# Patient Record
Sex: FEMALE | Race: WHITE | NOT HISPANIC OR LATINO | Employment: UNEMPLOYED | URBAN - METROPOLITAN AREA
[De-identification: names, ages, dates, MRNs, and addresses within clinical notes are randomized per-mention and may not be internally consistent; named-entity substitution may affect disease eponyms.]

---

## 2022-07-22 ENCOUNTER — LAB REQUISITION (OUTPATIENT)
Dept: LAB | Facility: CLINIC | Age: 22
End: 2022-07-22

## 2022-07-22 DIAGNOSIS — O99.019 ANEMIA COMPLICATING PREGNANCY, UNSPECIFIED TRIMESTER: ICD-10-CM

## 2022-07-22 DIAGNOSIS — Z36.87 ENCOUNTER FOR ANTENATAL SCREENING FOR UNCERTAIN DATES: ICD-10-CM

## 2022-07-22 LAB
ERYTHROCYTE [DISTWIDTH] IN BLOOD BY AUTOMATED COUNT: 13.9 % (ref 10–15)
HCT VFR BLD AUTO: 32.7 % (ref 35–47)
HGB BLD-MCNC: 10.6 G/DL (ref 11.7–15.7)
MCH RBC QN AUTO: 29.1 PG (ref 26.5–33)
MCHC RBC AUTO-ENTMCNC: 32.4 G/DL (ref 31.5–36.5)
MCV RBC AUTO: 90 FL (ref 78–100)
PLATELET # BLD AUTO: 320 10E3/UL (ref 150–450)
RBC # BLD AUTO: 3.64 10E6/UL (ref 3.8–5.2)
WBC # BLD AUTO: 12.6 10E3/UL (ref 4–11)

## 2022-07-22 PROCEDURE — 85027 COMPLETE CBC AUTOMATED: CPT | Performed by: ADVANCED PRACTICE MIDWIFE

## 2022-07-22 PROCEDURE — 82728 ASSAY OF FERRITIN: CPT | Performed by: ADVANCED PRACTICE MIDWIFE

## 2022-07-22 PROCEDURE — 87653 STREP B DNA AMP PROBE: CPT | Performed by: ADVANCED PRACTICE MIDWIFE

## 2022-07-22 PROCEDURE — 36415 COLL VENOUS BLD VENIPUNCTURE: CPT | Performed by: ADVANCED PRACTICE MIDWIFE

## 2022-07-23 LAB — FERRITIN SERPL-MCNC: 8 NG/ML (ref 6–175)

## 2022-07-24 LAB — GP B STREP DNA SPEC QL NAA+PROBE: NEGATIVE

## 2022-08-09 ENCOUNTER — LAB REQUISITION (OUTPATIENT)
Dept: LAB | Facility: CLINIC | Age: 22
End: 2022-08-09
Payer: COMMERCIAL

## 2022-08-09 DIAGNOSIS — Z01.89 ENCOUNTER FOR OTHER SPECIFIED SPECIAL EXAMINATIONS: ICD-10-CM

## 2022-08-09 LAB — SARS-COV-2 RNA RESP QL NAA+PROBE: NEGATIVE

## 2022-08-09 PROCEDURE — U0005 INFEC AGEN DETEC AMPLI PROBE: HCPCS | Mod: ORL | Performed by: OBSTETRICS & GYNECOLOGY

## 2023-04-11 ENCOUNTER — TRANSFERRED RECORDS (OUTPATIENT)
Dept: HEALTH INFORMATION MANAGEMENT | Facility: CLINIC | Age: 23
End: 2023-04-11
Payer: MEDICAID

## 2023-05-02 ENCOUNTER — TELEPHONE (OUTPATIENT)
Dept: ORTHOPEDICS | Facility: CLINIC | Age: 23
End: 2023-05-02
Payer: MEDICAID

## 2023-05-02 ENCOUNTER — MEDICAL CORRESPONDENCE (OUTPATIENT)
Dept: HEALTH INFORMATION MANAGEMENT | Facility: CLINIC | Age: 23
End: 2023-05-02
Payer: MEDICAID

## 2023-05-02 NOTE — TELEPHONE ENCOUNTER
[facility] Call Center    Phone Message    May a detailed message be left on voicemail: yes     Reason for Call: Other: Hello, This patient was referred to  or  for tumor by Dr. Proctor from [facility]. First available isn't until 5/10 which I currently have her down, are we able to get her in sooner?     Action Taken: Other: CSC    Travel Screening: Not Applicable

## 2023-05-03 NOTE — TELEPHONE ENCOUNTER
Action May 3, 2023 8:38 AM MT   Action Taken Sent a request for records from [facility] [phone].     DIAGNOSIS: PVNS- RT Knee   APPOINTMENT DATE: 05/05/2023   NOTES STATUS DETAILS   OFFICE NOTE from referring provider Media Tab 04/11/2023 - Dr. Trey Weems - [facility]   OFFICE NOTE from other specialist Internal 01/07/2014 - Alysa Hernandez MD - [facility]   MEDICATION LIST Internal    LABS     MRI PACS [facility] [place]:  04/24/2023 - RT Knee   XRAYS (IMAGES & REPORTS) PACS [facility]:  02/14/2023 - RT Knee/Bilateral Knee    Internal:  01/07/2014 - Bilateral Knee  01/07/2014 - [facility]

## 2023-05-05 ENCOUNTER — PRE VISIT (OUTPATIENT)
Dept: ORTHOPEDICS | Facility: CLINIC | Age: 23
End: 2023-05-05

## 2023-05-05 ENCOUNTER — OFFICE VISIT (OUTPATIENT)
Dept: ORTHOPEDICS | Facility: CLINIC | Age: 23
End: 2023-05-05
Payer: COMMERCIAL

## 2023-05-05 VITALS — HEIGHT: 66 IN | WEIGHT: 178 LBS | BODY MASS INDEX: 28.61 KG/M2

## 2023-05-05 DIAGNOSIS — M65.969 SYNOVITIS OF KNEE: Primary | ICD-10-CM

## 2023-05-05 PROCEDURE — 99204 OFFICE O/P NEW MOD 45 MIN: CPT | Performed by: ORTHOPAEDIC SURGERY

## 2023-05-05 RX ORDER — IBUPROFEN 200 MG
200-400 TABLET ORAL
Status: ON HOLD | COMMUNITY
End: 2023-07-25

## 2023-05-05 NOTE — LETTER
5/5/2023         RE: Muna Cobos  [address]  [facility] [place] [address]        Dear Colleague,    Thank you for referring your patient, Muna Cobos, to the [facility] [place]. Please see a copy of my visit note below.    This patient has noticed right knee stiffness and swelling over the past 4 months.  Its difficult for her to flex her knee all the way.  The discomfort is frequent if not constant.  She denies any injury.  There do not seem to be any particular exacerbating factors.    On examination she is alert oriented has a normal mood and affect and is in no acute distress.  Respirations are regular and unlabored eyes are nonicteric.  She is here with her  and very young child.  In the right lower extremity there is no edema or erythema.  She does have a knee effusion and the right lower extremity is grossly neurovascularly intact.    Reviewed her MRI and she has diffuse synovitis throughout the knee.  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    Given her persistent symptoms and the appearance on MRI I have recommended arthroscopic synovectomy in the front and if this is confirmed as tenosynovial giant cell tumor we will continue under the same anesthesia to do an open posterior synovectomy.  She is aware that there are medical treatments available that may offer stabilization of the disease with a less pain and increased motion but that we typically will reserve these for recurrent disease.  These treatments do have some side effects as well.  She is on board with going ahead with the excision.  I will reach out to her sports surgeons that help me with the arthroscopic approach in the front and we will arrange a time to do this excision.  I have answered all of her questions today.  She is aware the risks of bleeding infection pain numbness tingling stiffness  weakness.        Alexis Diane MD

## 2023-05-05 NOTE — NURSING NOTE
"Reason For Visit:   Chief Complaint   Patient presents with     Consult     Right knee pain and effusion, possible PVNS referred by Dr. Trey Weems at [facility] // first noticed pain about 4 months ago        Ht 1.67 m (5' 5.75\")   Wt 80.7 kg (178 lb)   BMI 28.95 kg/m      Pain Assessment  Patient Currently in Pain: Yes  0-10 Pain Scale: 5 (can get up to 10 in the morning)        Jose Manuel Kellogg ATC    "

## 2023-05-05 NOTE — PROGRESS NOTES
This patient has noticed right knee stiffness and swelling over the past 4 months.  Its difficult for her to flex her knee all the way.  The discomfort is frequent if not constant.  She denies any injury.  There do not seem to be any particular exacerbating factors.    On examination she is alert oriented has a normal mood and affect and is in no acute distress.  Respirations are regular and unlabored eyes are nonicteric.  She is here with her  and very young child.  In the right lower extremity there is no edema or erythema.  She does have a knee effusion and the right lower extremity is grossly neurovascularly intact.    Reviewed her MRI and she has diffuse synovitis throughout the knee.  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    Given her persistent symptoms and the appearance on MRI I have recommended arthroscopic synovectomy in the front and if this is confirmed as tenosynovial giant cell tumor we will continue under the same anesthesia to do an open posterior synovectomy.  She is aware that there are medical treatments available that may offer stabilization of the disease with a less pain and increased motion but that we typically will reserve these for recurrent disease.  These treatments do have some side effects as well.  She is on board with going ahead with the excision.  I will reach out to her sports surgeons that help me with the arthroscopic approach in the front and we will arrange a time to do this excision.  I have answered all of her questions today.  She is aware the risks of bleeding infection pain numbness tingling stiffness weakness.

## 2023-05-09 NOTE — TELEPHONE ENCOUNTER
----- Message -----  From: Alexis Diane MD  Sent: 5/5/2023   4:23 PM CDT  To: Dana Monteiro RN; #    Porter,    This is a patient that needs a front back synovectomy for knee synovitis.  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  She is 22.  I am happy to have her see you.  Thanks as always.    Lester

## 2023-05-09 NOTE — TELEPHONE ENCOUNTER
Contacted patient, she had a visit with Dr. Diane on Friday, May 5.  Patient is to be scheduled for surgery.    Went over surgical education with patient.  Advised that one of our surgery schedulers should be contacting her soon to get surgery scheduled.  She will give us a call if she has not heard by the end of the week.  See patient's office visit for surgery education documentation.

## 2023-05-09 NOTE — NURSING NOTE
A call was placed to the patient and pre-op teaching was performed over the phone.    Teaching Flowsheet   Relevant Diagnosis: Pre-Op Teaching  Teaching Topic: right knee open posterior synovectomy      Person(s) involved in teaching:   Patient     Motivation Level:  Asks Questions: Yes  Eager to Learn: Yes  Cooperative: Yes  Receptive (willing/able to accept information): Yes  Any cultural factors/Cheondoism beliefs that may influence understanding or compliance? No  Comments: none     Patient demonstrates understanding of the following:  Reason for the appointment, diagnosis and treatment plan: Yes  Knowledge of proper use of medications and conditions for which they are ordered (with special attention to potential side effects or drug interactions): Yes  Which situations necessitate calling provider and whom to contact: Yes- discussed the stoplight tool to help assist with this.      Teaching Concerns Addressed:   Comments: patient instructed to stop ibuprofen, aleve or aspirin 5 days prior to surgery.  Okay to take Tylenol for pain.       Proper use of surgical scrub explain and provided to patient.    Pain management techniques: Yes  Wound Care: Yes  How and/when to access community resources: Yes     Instructional Materials Used/Given:      - Important contact info/ phone numbers  - Map/ location of surgery  - Medications to avoid  - Showering instructions  - Stop light tool    Additionally the following was discussed with patient:  - Fatemeh Pfeiffer, will be driving the patient to surgery and staying with them for 24 hours.        -Next step: Schedule a surgery date and schedule a Pre-Op appointment with PCP

## 2023-05-12 ENCOUNTER — TELEPHONE (OUTPATIENT)
Dept: ORTHOPEDICS | Facility: CLINIC | Age: 23
End: 2023-05-12
Payer: MEDICAID

## 2023-05-12 NOTE — TELEPHONE ENCOUNTER
Phoned patient to inform her that we are still working on finding a date that works for both surgeons.     Patient expressed understanding and thanked caller in filling her in on the progress of scheduling her surgery.     Patient stated that she would like something soon as possible but will also take any dates that work for both surgeons.     Patient was informed that surgery coordinator will reach the other surgeons office again to establish a few dates and then have patient choose whichever will work best for her.     Patient understood and will await for a call back by the end of the week, next week.     Patient had no other questions or concerns.   Provided call back number of [phone] and [phone] for care team.

## 2023-05-17 NOTE — TELEPHONE ENCOUNTER
Patient is scheduled for surgery with Dr. Diane & Dr. Xiong    Spoke with: Muna    Date of Surgery: 7/25/23    Location: [facility] OR    Informed patient they will need an adult  Yes    H&P: Scheduled with PCP, patient will schedule    Additional imaging/appointments: POP Made    Surgery packet: Received     Additional comments: N/A        Mary Alice Clifton on 5/17/2023 at 2:10 PM

## 2023-05-21 ENCOUNTER — HEALTH MAINTENANCE LETTER (OUTPATIENT)
Age: 23
End: 2023-05-21

## 2023-07-24 ENCOUNTER — ANESTHESIA EVENT (OUTPATIENT)
Dept: SURGERY | Facility: CLINIC | Age: 23
End: 2023-07-24
Payer: COMMERCIAL

## 2023-07-25 ENCOUNTER — HOSPITAL ENCOUNTER (OUTPATIENT)
Facility: CLINIC | Age: 23
Discharge: HOME | End: 2023-07-25
Attending: ORTHOPAEDIC SURGERY | Admitting: ORTHOPAEDIC SURGERY
Payer: COMMERCIAL

## 2023-07-25 ENCOUNTER — ANESTHESIA (OUTPATIENT)
Dept: SURGERY | Facility: CLINIC | Age: 23
End: 2023-07-25
Payer: COMMERCIAL

## 2023-07-25 VITALS
DIASTOLIC BLOOD PRESSURE: 73 MMHG | SYSTOLIC BLOOD PRESSURE: 109 MMHG | RESPIRATION RATE: 14 BRPM | BODY MASS INDEX: 30.63 KG/M2 | WEIGHT: 183.86 LBS | TEMPERATURE: 98.1 F | HEIGHT: 65 IN | OXYGEN SATURATION: 94 % | HEART RATE: 91 BPM

## 2023-07-25 DIAGNOSIS — M12.20 PVNS (PIGMENTED VILLONODULAR SYNOVITIS): Primary | ICD-10-CM

## 2023-07-25 PROCEDURE — 250N000009 HC RX 250: Performed by: ANESTHESIOLOGY

## 2023-07-25 PROCEDURE — 250N000011 HC RX IP 250 OP 636: Performed by: ANESTHESIOLOGY

## 2023-07-25 PROCEDURE — 250N000011 HC RX IP 250 OP 636: Mod: JZ

## 2023-07-25 PROCEDURE — 360N000076 HC SURGERY LEVEL 3, PER MIN: Performed by: ORTHOPAEDIC SURGERY

## 2023-07-25 PROCEDURE — 27334 REMOVE KNEE JOINT LINING: CPT | Mod: RT | Performed by: ORTHOPAEDIC SURGERY

## 2023-07-25 PROCEDURE — 250N000009 HC RX 250: Performed by: ORTHOPAEDIC SURGERY

## 2023-07-25 PROCEDURE — 272N000001 HC OR GENERAL SUPPLY STERILE: Performed by: ORTHOPAEDIC SURGERY

## 2023-07-25 PROCEDURE — 250N000011 HC RX IP 250 OP 636: Performed by: NURSE ANESTHETIST, CERTIFIED REGISTERED

## 2023-07-25 PROCEDURE — 29876 ARTHRS KNEE SURG SYNVCT MAJ: CPT | Mod: 59 | Performed by: ORTHOPAEDIC SURGERY

## 2023-07-25 PROCEDURE — 999N000141 HC STATISTIC PRE-PROCEDURE NURSING ASSESSMENT: Performed by: ORTHOPAEDIC SURGERY

## 2023-07-25 PROCEDURE — 82962 GLUCOSE BLOOD TEST: CPT

## 2023-07-25 PROCEDURE — 88311 DECALCIFY TISSUE: CPT | Mod: 26 | Performed by: PATHOLOGY

## 2023-07-25 PROCEDURE — 88311 DECALCIFY TISSUE: CPT | Mod: TC | Performed by: ORTHOPAEDIC SURGERY

## 2023-07-25 PROCEDURE — 88305 TISSUE EXAM BY PATHOLOGIST: CPT | Mod: 26 | Performed by: PATHOLOGY

## 2023-07-25 PROCEDURE — 710N000010 HC RECOVERY PHASE 1, LEVEL 2, PER MIN: Performed by: ORTHOPAEDIC SURGERY

## 2023-07-25 PROCEDURE — 258N000003 HC RX IP 258 OP 636: Performed by: NURSE ANESTHETIST, CERTIFIED REGISTERED

## 2023-07-25 PROCEDURE — 250N000009 HC RX 250: Performed by: NURSE ANESTHETIST, CERTIFIED REGISTERED

## 2023-07-25 PROCEDURE — 250N000025 HC SEVOFLURANE, PER MIN: Performed by: ORTHOPAEDIC SURGERY

## 2023-07-25 PROCEDURE — 250N000011 HC RX IP 250 OP 636: Performed by: PHYSICIAN ASSISTANT

## 2023-07-25 PROCEDURE — 250N000013 HC RX MED GY IP 250 OP 250 PS 637

## 2023-07-25 PROCEDURE — 250N000011 HC RX IP 250 OP 636: Mod: JZ | Performed by: ANESTHESIOLOGY

## 2023-07-25 PROCEDURE — 710N000012 HC RECOVERY PHASE 2, PER MINUTE: Performed by: ORTHOPAEDIC SURGERY

## 2023-07-25 PROCEDURE — 370N000017 HC ANESTHESIA TECHNICAL FEE, PER MIN: Performed by: ORTHOPAEDIC SURGERY

## 2023-07-25 RX ORDER — CEFAZOLIN SODIUM/WATER 2 G/20 ML
2 SYRINGE (ML) INTRAVENOUS
Status: COMPLETED | OUTPATIENT
Start: 2023-07-25 | End: 2023-07-25

## 2023-07-25 RX ORDER — NALOXONE HYDROCHLORIDE 0.4 MG/ML
0.4 INJECTION, SOLUTION INTRAMUSCULAR; INTRAVENOUS; SUBCUTANEOUS
Status: DISCONTINUED | OUTPATIENT
Start: 2023-07-25 | End: 2023-07-25 | Stop reason: HOSPADM

## 2023-07-25 RX ORDER — OXYCODONE HYDROCHLORIDE 5 MG/1
5-10 TABLET ORAL EVERY 4 HOURS PRN
Qty: 20 TABLET | Refills: 0 | Status: SHIPPED | OUTPATIENT
Start: 2023-07-25

## 2023-07-25 RX ORDER — NALOXONE HYDROCHLORIDE 0.4 MG/ML
0.2 INJECTION, SOLUTION INTRAMUSCULAR; INTRAVENOUS; SUBCUTANEOUS
Status: DISCONTINUED | OUTPATIENT
Start: 2023-07-25 | End: 2023-07-25 | Stop reason: HOSPADM

## 2023-07-25 RX ORDER — HYDROMORPHONE HYDROCHLORIDE 1 MG/ML
0.4 INJECTION, SOLUTION INTRAMUSCULAR; INTRAVENOUS; SUBCUTANEOUS EVERY 5 MIN PRN
Status: DISCONTINUED | OUTPATIENT
Start: 2023-07-25 | End: 2023-07-25 | Stop reason: HOSPADM

## 2023-07-25 RX ORDER — KETOROLAC TROMETHAMINE 30 MG/ML
INJECTION, SOLUTION INTRAMUSCULAR; INTRAVENOUS PRN
Status: DISCONTINUED | OUTPATIENT
Start: 2023-07-25 | End: 2023-07-25

## 2023-07-25 RX ORDER — CEFAZOLIN SODIUM/WATER 2 G/20 ML
2 SYRINGE (ML) INTRAVENOUS SEE ADMIN INSTRUCTIONS
Status: DISCONTINUED | OUTPATIENT
Start: 2023-07-25 | End: 2023-07-25 | Stop reason: HOSPADM

## 2023-07-25 RX ORDER — FENTANYL CITRATE 50 UG/ML
50 INJECTION, SOLUTION INTRAMUSCULAR; INTRAVENOUS EVERY 5 MIN PRN
Status: DISCONTINUED | OUTPATIENT
Start: 2023-07-25 | End: 2023-07-25 | Stop reason: HOSPADM

## 2023-07-25 RX ORDER — SODIUM CHLORIDE, SODIUM LACTATE, POTASSIUM CHLORIDE, CALCIUM CHLORIDE 600; 310; 30; 20 MG/100ML; MG/100ML; MG/100ML; MG/100ML
INJECTION, SOLUTION INTRAVENOUS CONTINUOUS PRN
Status: DISCONTINUED | OUTPATIENT
Start: 2023-07-25 | End: 2023-07-25

## 2023-07-25 RX ORDER — PROPOFOL 10 MG/ML
INJECTION, EMULSION INTRAVENOUS PRN
Status: DISCONTINUED | OUTPATIENT
Start: 2023-07-25 | End: 2023-07-25

## 2023-07-25 RX ORDER — FLUMAZENIL 0.1 MG/ML
0.2 INJECTION, SOLUTION INTRAVENOUS
Status: DISCONTINUED | OUTPATIENT
Start: 2023-07-25 | End: 2023-07-25 | Stop reason: HOSPADM

## 2023-07-25 RX ORDER — MAGNESIUM HYDROXIDE 1200 MG/15ML
LIQUID ORAL PRN
Status: DISCONTINUED | OUTPATIENT
Start: 2023-07-25 | End: 2023-07-25 | Stop reason: HOSPADM

## 2023-07-25 RX ORDER — DEXAMETHASONE SODIUM PHOSPHATE 10 MG/ML
INJECTION, SOLUTION INTRAMUSCULAR; INTRAVENOUS
Status: COMPLETED | OUTPATIENT
Start: 2023-07-25 | End: 2023-07-25

## 2023-07-25 RX ORDER — OXYCODONE HYDROCHLORIDE 5 MG/1
5 TABLET ORAL
Status: COMPLETED | OUTPATIENT
Start: 2023-07-25 | End: 2023-07-25

## 2023-07-25 RX ORDER — FENTANYL CITRATE 50 UG/ML
INJECTION, SOLUTION INTRAMUSCULAR; INTRAVENOUS PRN
Status: DISCONTINUED | OUTPATIENT
Start: 2023-07-25 | End: 2023-07-25

## 2023-07-25 RX ORDER — SODIUM CHLORIDE, SODIUM LACTATE, POTASSIUM CHLORIDE, CALCIUM CHLORIDE 600; 310; 30; 20 MG/100ML; MG/100ML; MG/100ML; MG/100ML
INJECTION, SOLUTION INTRAVENOUS CONTINUOUS
Status: DISCONTINUED | OUTPATIENT
Start: 2023-07-25 | End: 2023-07-25 | Stop reason: HOSPADM

## 2023-07-25 RX ORDER — METHOCARBAMOL 750 MG/1
750 TABLET, FILM COATED ORAL
Status: COMPLETED | OUTPATIENT
Start: 2023-07-25 | End: 2023-07-25

## 2023-07-25 RX ORDER — ACETAMINOPHEN 325 MG/1
650 TABLET ORAL
Status: DISCONTINUED | OUTPATIENT
Start: 2023-07-25 | End: 2023-07-25 | Stop reason: HOSPADM

## 2023-07-25 RX ORDER — BUPIVACAINE HYDROCHLORIDE 2.5 MG/ML
INJECTION, SOLUTION EPIDURAL; INFILTRATION; INTRACAUDAL
Status: COMPLETED | OUTPATIENT
Start: 2023-07-25 | End: 2023-07-25

## 2023-07-25 RX ORDER — ASPIRIN 81 MG/1
162 TABLET ORAL DAILY
Qty: 56 TABLET | Refills: 0 | Status: SHIPPED | OUTPATIENT
Start: 2023-07-25 | End: 2023-07-29 | Stop reason: ALTCHOICE

## 2023-07-25 RX ORDER — ONDANSETRON 4 MG/1
4 TABLET, ORALLY DISINTEGRATING ORAL EVERY 30 MIN PRN
Status: DISCONTINUED | OUTPATIENT
Start: 2023-07-25 | End: 2023-07-25 | Stop reason: HOSPADM

## 2023-07-25 RX ORDER — DEXMEDETOMIDINE HYDROCHLORIDE 4 UG/ML
INJECTION, SOLUTION INTRAVENOUS
Status: COMPLETED | OUTPATIENT
Start: 2023-07-25 | End: 2023-07-25

## 2023-07-25 RX ORDER — FENTANYL CITRATE 50 UG/ML
25 INJECTION, SOLUTION INTRAMUSCULAR; INTRAVENOUS EVERY 5 MIN PRN
Status: DISCONTINUED | OUTPATIENT
Start: 2023-07-25 | End: 2023-07-25 | Stop reason: HOSPADM

## 2023-07-25 RX ORDER — ONDANSETRON 2 MG/ML
INJECTION INTRAMUSCULAR; INTRAVENOUS PRN
Status: DISCONTINUED | OUTPATIENT
Start: 2023-07-25 | End: 2023-07-25

## 2023-07-25 RX ORDER — HYDROMORPHONE HYDROCHLORIDE 1 MG/ML
0.2 INJECTION, SOLUTION INTRAMUSCULAR; INTRAVENOUS; SUBCUTANEOUS EVERY 5 MIN PRN
Status: DISCONTINUED | OUTPATIENT
Start: 2023-07-25 | End: 2023-07-25 | Stop reason: HOSPADM

## 2023-07-25 RX ORDER — DEXAMETHASONE SODIUM PHOSPHATE 4 MG/ML
INJECTION, SOLUTION INTRA-ARTICULAR; INTRALESIONAL; INTRAMUSCULAR; INTRAVENOUS; SOFT TISSUE PRN
Status: DISCONTINUED | OUTPATIENT
Start: 2023-07-25 | End: 2023-07-25

## 2023-07-25 RX ORDER — LIDOCAINE HYDROCHLORIDE 20 MG/ML
INJECTION, SOLUTION INFILTRATION; PERINEURAL PRN
Status: DISCONTINUED | OUTPATIENT
Start: 2023-07-25 | End: 2023-07-25

## 2023-07-25 RX ORDER — ONDANSETRON 2 MG/ML
4 INJECTION INTRAMUSCULAR; INTRAVENOUS EVERY 30 MIN PRN
Status: DISCONTINUED | OUTPATIENT
Start: 2023-07-25 | End: 2023-07-25 | Stop reason: HOSPADM

## 2023-07-25 RX ORDER — ACETAMINOPHEN 325 MG/1
650 TABLET ORAL EVERY 4 HOURS PRN
Qty: 50 TABLET | Refills: 0 | Status: SHIPPED | OUTPATIENT
Start: 2023-07-25

## 2023-07-25 RX ORDER — FENTANYL CITRATE 50 UG/ML
25-50 INJECTION, SOLUTION INTRAMUSCULAR; INTRAVENOUS
Status: DISCONTINUED | OUTPATIENT
Start: 2023-07-25 | End: 2023-07-25 | Stop reason: HOSPADM

## 2023-07-25 RX ADMIN — PROPOFOL 200 MG: 10 INJECTION, EMULSION INTRAVENOUS at 13:11

## 2023-07-25 RX ADMIN — ONDANSETRON 4 MG: 2 INJECTION INTRAMUSCULAR; INTRAVENOUS at 18:04

## 2023-07-25 RX ADMIN — KETOROLAC TROMETHAMINE 30 MG: 30 INJECTION, SOLUTION INTRAMUSCULAR at 15:41

## 2023-07-25 RX ADMIN — HYDROMORPHONE HYDROCHLORIDE 0.2 MG: 1 INJECTION, SOLUTION INTRAMUSCULAR; INTRAVENOUS; SUBCUTANEOUS at 17:41

## 2023-07-25 RX ADMIN — DEXMEDETOMIDINE 20 MCG: 100 INJECTION, SOLUTION, CONCENTRATE INTRAVENOUS at 12:35

## 2023-07-25 RX ADMIN — MIDAZOLAM 1 MG: 1 INJECTION INTRAMUSCULAR; INTRAVENOUS at 12:34

## 2023-07-25 RX ADMIN — FENTANYL CITRATE 50 MCG: 50 INJECTION INTRAMUSCULAR; INTRAVENOUS at 16:49

## 2023-07-25 RX ADMIN — ACETAMINOPHEN 650 MG: 325 TABLET, FILM COATED ORAL at 17:58

## 2023-07-25 RX ADMIN — BUPIVACAINE HYDROCHLORIDE 20 ML: 2.5 INJECTION, SOLUTION EPIDURAL; INFILTRATION; INTRACAUDAL at 12:35

## 2023-07-25 RX ADMIN — METHOCARBAMOL 750 MG: 750 TABLET ORAL at 18:56

## 2023-07-25 RX ADMIN — DEXAMETHASONE SODIUM PHOSPHATE 2 MG: 10 INJECTION, SOLUTION INTRAMUSCULAR; INTRAVENOUS at 12:35

## 2023-07-25 RX ADMIN — HYDROMORPHONE HYDROCHLORIDE 0.5 MG: 1 INJECTION, SOLUTION INTRAMUSCULAR; INTRAVENOUS; SUBCUTANEOUS at 14:37

## 2023-07-25 RX ADMIN — LIDOCAINE HYDROCHLORIDE 100 MG: 20 INJECTION, SOLUTION INFILTRATION; PERINEURAL at 13:10

## 2023-07-25 RX ADMIN — FENTANYL CITRATE 25 MCG: 50 INJECTION INTRAMUSCULAR; INTRAVENOUS at 16:18

## 2023-07-25 RX ADMIN — OXYCODONE HYDROCHLORIDE 5 MG: 5 TABLET ORAL at 18:56

## 2023-07-25 RX ADMIN — Medication 2 G: at 13:18

## 2023-07-25 RX ADMIN — FENTANYL CITRATE 50 MCG: 50 INJECTION INTRAMUSCULAR; INTRAVENOUS at 17:07

## 2023-07-25 RX ADMIN — FENTANYL CITRATE 50 MCG: 50 INJECTION INTRAMUSCULAR; INTRAVENOUS at 12:33

## 2023-07-25 RX ADMIN — ONDANSETRON 4 MG: 2 INJECTION INTRAMUSCULAR; INTRAVENOUS at 15:39

## 2023-07-25 RX ADMIN — PROCHLORPERAZINE EDISYLATE 5 MG: 5 INJECTION INTRAMUSCULAR; INTRAVENOUS at 18:53

## 2023-07-25 RX ADMIN — DEXAMETHASONE SODIUM PHOSPHATE 6 MG: 4 INJECTION, SOLUTION INTRA-ARTICULAR; INTRALESIONAL; INTRAMUSCULAR; INTRAVENOUS; SOFT TISSUE at 14:33

## 2023-07-25 RX ADMIN — HYDROMORPHONE HYDROCHLORIDE 0.4 MG: 1 INJECTION, SOLUTION INTRAMUSCULAR; INTRAVENOUS; SUBCUTANEOUS at 17:47

## 2023-07-25 RX ADMIN — FENTANYL CITRATE 100 MCG: 50 INJECTION, SOLUTION INTRAMUSCULAR; INTRAVENOUS at 13:10

## 2023-07-25 RX ADMIN — SODIUM CHLORIDE, POTASSIUM CHLORIDE, SODIUM LACTATE AND CALCIUM CHLORIDE: 600; 310; 30; 20 INJECTION, SOLUTION INTRAVENOUS at 13:01

## 2023-07-25 RX ADMIN — SUGAMMADEX 200 MG: 100 INJECTION, SOLUTION INTRAVENOUS at 15:47

## 2023-07-25 RX ADMIN — Medication 50 MG: at 13:12

## 2023-07-25 RX ADMIN — FENTANYL CITRATE 25 MCG: 50 INJECTION INTRAMUSCULAR; INTRAVENOUS at 16:28

## 2023-07-25 RX ADMIN — HYDROMORPHONE HYDROCHLORIDE 0.2 MG: 1 INJECTION, SOLUTION INTRAMUSCULAR; INTRAVENOUS; SUBCUTANEOUS at 17:32

## 2023-07-25 ASSESSMENT — ACTIVITIES OF DAILY LIVING (ADL)
ADLS_ACUITY_SCORE: 19
ADLS_ACUITY_SCORE: 35

## 2023-07-25 NOTE — ANESTHESIA PREPROCEDURE EVALUATION
Anesthesia Pre-Procedure Evaluation    Patient: Muna Cobos   MRN: [id number] : 2000        Procedure : Procedure(s):  Right anterior knee arthroscopic  synovectomy  Right Knee Open Posterior Synovectomy          History reviewed. No pertinent past medical history.   Past Surgical History:   Procedure Laterality Date    LAPAROSCOPIC APPENDECTOMY N/A 2016    Procedure: LAPAROSCOPIC APPENDECTOMY;  Surgeon: Jesus Joiner MD;  Location: [facility] OR      No Known Allergies   Social History     Tobacco Use    Smoking status: Never    Smokeless tobacco: Not on file   Substance Use Topics    Alcohol use: Not on file      Wt Readings from Last 1 Encounters:   23 83.4 kg (183 lb 13.8 oz)        Anesthesia Evaluation   Pt has had prior anesthetic.         ROS/MED HX  ENT/Pulmonary:  - neg pulmonary ROS     Neurologic:  - neg neurologic ROS     Cardiovascular:  - neg cardiovascular ROS     METS/Exercise Tolerance:     Hematologic:  - neg hematologic  ROS     Musculoskeletal:  - neg musculoskeletal ROS     GI/Hepatic:  - neg GI/hepatic ROS     Renal/Genitourinary:  - neg Renal ROS     Endo:  - neg endo ROS     Psychiatric/Substance Use:  - neg psychiatric ROS     Infectious Disease:  - neg infectious disease ROS     Malignancy:       Other:            Physical Exam    Airway        Mallampati: I   TM distance: > 3 FB   Neck ROM: full   Mouth opening: > 3 cm    Respiratory Devices and Support         Dental       (+) Minor Abnormalities - some fillings, tiny chips      Cardiovascular   cardiovascular exam normal          Pulmonary                   OUTSIDE LABS:  CBC:   Lab Results   Component Value Date    WBC 12.6 (H) 2022    HGB 10.6 (L) 2022    HCT 32.7 (L) 2022     2022     BMP: No results found for: NA, POTASSIUM, CHLORIDE, CO2, BUN, CR, GLC  COAGS: No results found for: PTT, INR, FIBR  POC: No results found for: BGM, HCG, HCGS  HEPATIC: No results found for: ALBUMIN,  PROTTOTAL, ALT, AST, GGT, ALKPHOS, BILITOTAL, BILIDIRECT, CLIFF  OTHER: No results found for: PH, LACT, A1C, ORIANA, PHOS, MAG, LIPASE, AMYLASE, TSH, T4, T3, CRP, SED    Anesthesia Plan    ASA Status:  1    NPO Status:  NPO Appropriate    Anesthesia Type: General.     - Airway: ETT   Induction: Intravenous, Propofol.           Consents    Anesthesia Plan(s) and associated risks, benefits, and realistic alternatives discussed. Questions answered and patient/representative(s) expressed understanding.     - Discussed: Risks, Benefits and Alternatives for the PROCEDURE were discussed     - Discussed with:  Patient      - Extended Intubation/Ventilatory Support Discussed: No.      - Patient is DNR/DNI Status: No     Use of blood products discussed: No .     Postoperative Care    Pain management: IV analgesics, Oral pain medications, Peripheral nerve block (Single Shot), Multi-modal analgesia.   PONV prophylaxis: Ondansetron (or other 5HT-3), Background Propofol Infusion, Dexamethasone or Solumedrol     Comments:                Shalom Garcia MD

## 2023-07-25 NOTE — BRIEF OP NOTE
[facility]    Brief Operative Note    Pre-operative diagnosis: Synovitis of knee [M65.9]  Post-operative diagnosis Same as pre-operative diagnosis    Procedure: Procedure(s):  Right anterior knee arthroscopic  synovectomy  Right Knee Open Posterior Synovectomy  Surgeon: Surgeon(s) and Role:  Panel 1:     * Pj Xiong MD - Primary     * Faizan Washington MD - Resident - Assisting  Panel 2:     * Alexis Diane MD - Primary     * Faizan Washington MD - Resident - Assisting  Anesthesia: General with Block     Drains: None  Specimens:   ID Type Source Tests Collected by Time Destination   1 : anterior knee synovium Tissue Knee, Right SURGICAL PATHOLOGY EXAM Pj Xiong MD 7/25/2023  1:51 PM    2 : posterior knee synovium Tissue Knee, Right SURGICAL PATHOLOGY EXAM Alexis Diane MD 7/25/2023  3:13 PM      Findings:   See full op note .  Complications: None.  Implants: * No implants in log *    POSTOP PLAN:  - AAT, WBAT and ROMAT right knee  - ASA 162mg daily x 4 weeks for dvt ppx  - Follow-up on 8/9 as scheduled with Dr Roxi Washington MD  Orthopedic Surgery, PGY-4

## 2023-07-25 NOTE — ANESTHESIA PROCEDURE NOTES
Airway       Patient location during procedure: OR       Procedure Start/Stop Times: 7/25/2023 1:14 PM  Staff -        Anesthesiologist:  Shalom Garcia MD       CRNA: Aicha Reardon APRN CRNA       Performed By: CRNA  Consent for Airway        Urgency: elective  Indications and Patient Condition       Indications for airway management: germania-procedural       Induction type:intravenous       Mask difficulty assessment: 1 - vent by mask    Final Airway Details       Final airway type: endotracheal airway       Successful airway: ETT - single  Endotracheal Airway Details        ETT size (mm): 7.5       Cuffed: yes       Successful intubation technique: direct laryngoscopy       DL Blade Type: MAC 3       Grade View of Cords: 1       Adjucts: stylet       Position: Right       Measured from: gums/teeth       Secured at (cm): 22       Bite block used: None    Post intubation assessment        Placement verified by: capnometry, equal breath sounds and chest rise        Number of attempts at approach: 1       Number of other approaches attempted: 0       Secured with: pink tape       Ease of procedure: easy       Dentition: Intact and Unchanged    Medication(s) Administered   Medication Administration Time: 7/25/2023 1:14 PM

## 2023-07-25 NOTE — DISCHARGE INSTRUCTIONS
Same-Day Surgery   Adult Discharge Orders & Instructions     For 24 hours after surgery:  Get plenty of rest.  A responsible adult must stay with you for at least 24 hours after you leave the hospital.   Pain medication can slow your reflexes. Do not drive or use heavy equipment.  If you have weakness or tingling, don't drive or use heavy equipment until this feeling goes away.  Mixing alcohol and pain medication can cause dizziness and slow your breathing. It can even be fatal. Do not drink alcohol while taking pain medication.  Avoid strenuous or risky activities.  Ask for help when climbing stairs.   You may feel lightheaded.  If so, sit for a few minutes before standing.  Have someone help you get up.   If you have nausea (feel sick to your stomach), drink only clear liquids such as apple juice, ginger ale, broth or 7-Up.  Rest may also help.  Be sure to drink enough fluids.  Move to a regular diet as you feel able. Take pain medications with a small amount of solid food, such as toast or crackers, to avoid nausea.   A slight fever is normal. Call the doctor if your fever is over 100 F (37.7 C) (taken under the tongue) or lasts longer than 24 hours.  You may have a dry mouth, muscle aches, trouble sleeping or a sore throat.  These symptoms should go away after 24 hours.  Do not make important or legal decisions.   Pain Management:      1. Take pain medication (if prescribed) for pain as directed by your physician.        2. WARNING: If the pain medication you have been prescribed contains Tylenol  (acetaminophen), DO NOT take additional doses of Tylenol (acetaminophen).     Call your doctor for any of the followin.  Signs of infection (fever, growing tenderness at the surgery site, severe pain, a large amount of drainage or bleeding, foul-smelling drainage, redness, swelling).    2.  It has been over 8 to 10 hours since surgery and you are still not able to urinate (pee).    3.  Headache for over 24  hours.    4.  Numbness, tingling or weakness the day after surgery (if you had spinal anesthesia).  To contact a doctor, call _____________________________________ or:  '   [phone] and ask for the Resident On Call for:          [facility]: [phone]  or ask for Ortho resident on call_______________________________________ (answered 24 hours a day)  '   Emergency Department:  [place] [facility]: [phone]  [place] [facility]: [phone]               Rev. 10/2014

## 2023-07-25 NOTE — OP NOTE
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 Diane for assessment and description of the posterior approach to the knee    ASSISTANT: None.     RESIDENT OR FELLOW: Robbin Washington MD    OPERATIVE INDICATIONS: Muna Cobos is a pleasant 23 year old who I saw in the preoperative area after she saw my partner in his clinic with a  history, physical, imaging consistent with right knee synovial base process presumably pigmented villonodular synovitis.   I had a chance to meet the patient the preoperative area where I was able to review with the patient the risks, benefits, complications, techniques and alternatives to surgery.  The patient voiced understanding of my role is doing the anterior procedure we reviewed the expected course of recovery and the potential expected outcomes.  The patient understood both the risks and benefits and desired to proceed despite the risks.    OPERATIVE DETAILS: In the preoperative area the patient's informed consent was reviewed and they desired to proceed.  The  leg was marked and the patient was in agreement.  The patient was taken to the operating room where a timeout was performed and all parties were in agreement.  Preoperative antibiotics were given within 1 hour of the time of incision.  The patient was placed in the supine position and surrendered to LMA anesthesia.  A thigh tourniquet was applied.  Egg crate was placed beneath the well leg and a side post was utilized.  The operative leg was prepped  and draped in the usual sterile fashion.     Examination Under Anesthesia: Range of motion 0 to 135 degrees.  Stable varus valgus stress testing.  Stable into posterior drawer testing.  No pivot shift.  Lachman 0.  1+ effusion.    Anterior medial and anterior lateral arthroscope portals were created diagnostic arthroscopy was performed the following findings: Significant synovitis was noted as well as the appearance of what a look like synovial chondromatosis with multiple smaller chondral flaps.  There was areas of calving within the suprapatellar pouch as well as the gutters.  A shaver was introduced and the free-floating rice bodies were removed without complication.  Then extensive synovectomy was performed starting the first the suprapatellar pouch, medial gutter, lateral gutter.  We then worked below with both the medial and lateral menisci.  The medial femoral condyle medial tibial plateau medial meniscus normal.  Lateral femoral condyle lateral tibial plateau lateral meniscus normal.  ACL PCL normal.  Synovitis along the intercondylar notch.  Synovitis in the anterior chamber.  Medial patella facet central ridge of the patella facet central trochlea normal.  At this time extensive synovectomy had been completed the suprapatellar pouch Comito gutter, lateral gutter anterior chamber ACL PCL below the menisci.  Hemostasis was insured.  The arthroscope was withdrawn.  The tourniquet was released.    Copious irrigation was performed an a layered closure was initiated, sterile dressings were applied and the care of the patient was turned over to Dr. Diane for the posterior approach..     ESTIMATED BLOOD LOSS: 5 mL.    TOURNIQUET TIME: A thigh tourniquet was placed and inflated for 33 minutes.    COMPLICATIONS: None apparent.    DRAINS: None.    SPECIMENS: None.     POSTOPERATIVE PLAN:  Weightbearing as tolerated   ROM as alina  Will defer to Dr Diane and his team for postoperative plan and followup

## 2023-07-25 NOTE — ANESTHESIA CARE TRANSFER NOTE
Patient: Muna Cobos    Procedure: Procedure(s):  Right anterior knee arthroscopic  synovectomy  Right Knee Open Posterior Synovectomy       Diagnosis: Synovitis of knee [M65.9]  Diagnosis Additional Information: No value filed.    Anesthesia Type:   General     Note:    Oropharynx: oropharynx clear of all foreign objects and spontaneously breathing  Level of Consciousness: drowsy  Oxygen Supplementation: nasal cannula  Level of Supplemental Oxygen (L/min / FiO2): 4  Independent Airway: airway patency satisfactory and stable  Dentition: dentition unchanged  Vital Signs Stable: post-procedure vital signs reviewed and stable  Report to RN Given: handoff report given  Patient transferred to: PACU    Handoff Report: Identifed the Patient, Identified the Reponsible Provider, Reviewed the pertinent medical history, Discussed the surgical course, Reviewed Intra-OP anesthesia mangement and issues during anesthesia, Set expectations for post-procedure period and Allowed opportunity for questions and acknowledgement of understanding      Vitals:  Vitals Value Taken Time   /68 07/25/23 1603   Temp 37    Pulse 122 07/25/23 1609   Resp 15 07/25/23 1609   SpO2 100 % 07/25/23 1609   Vitals shown include unvalidated device data.    Electronically Signed By: DORYS Harrington CRNA  July 25, 2023  4:10 PM

## 2023-07-25 NOTE — PROGRESS NOTES
Placed immobilizer on patient per order. Discussed with Dr. Washington and does not want immobilizer in place so removed from patient.

## 2023-07-25 NOTE — ANESTHESIA PROCEDURE NOTES
Adductor canal Procedure Note    Pre-Procedure   Staff -        Anesthesiologist:  Dian Johnson MD       Performed By: anesthesiologist       Location: pre-op       Procedure Start/Stop Times: 7/25/2023 12:35 PM and 7/25/2023 12:46 PM       Pre-Anesthestic Checklist: patient identified, IV checked, site marked, risks and benefits discussed, informed consent, monitors and equipment checked, pre-op evaluation, at physician/surgeon's request and post-op pain management  Timeout:       Correct Patient: Yes        Correct Procedure: Yes        Correct Site: Yes        Correct Position: Yes        Correct Laterality: Yes        Site Marked: Yes  Procedure Documentation  Procedure: Adductor canal       Diagnosis: POST OPERATIVE PAIN       Laterality: right       Patient Position: supine       Patient Prep/Sterile Barriers: sterile gloves, mask       Skin prep: Chloraprep       Needle Type: short bevel and insulated       Needle Gauge: 21.        Needle Length (millimeters): 110        Ultrasound guided       1. Ultrasound was used to identify targeted nerve, plexus, vascular marker, or fascial plane and place a needle adjacent to it in real-time.       2. Ultrasound was used to visualize the spread of anesthetic in close proximity to the above referenced structure.       3. A permanent image is entered into the patient's record.    Assessment/Narrative         The placement was negative for: blood aspirated, painful injection and site bleeding       Paresthesias: No.       Bolus given via needle..        Secured via.        Insertion/Infusion Method: Single Shot       Complications: none       Injection made incrementally with aspirations every 5 mL.    Medication(s) Administered   Bupivacaine 0.25% PF (Infiltration) - Infiltration   20 mL - 7/25/2023 12:35:00 PM  Dexmedetomidine 4 mcg/mL (Perineural) - Perineural   20 mcg - 7/25/2023 12:35:00 PM  Dexamethasone 10 mg/mL PF (Perineural) - Perineural   2 mg - 7/25/2023  "12:35:00 PM  Medication Administration Time: 7/25/2023 12:35 PM     Comments:  Discussed risks of nerve block, including nerve injury, bleeding, infection, incomplete analgesia. Discussed anticipated areas of sensory and motor block, limb precautions, and fall precautions. Discussed alternative of not performing a nerve block. Ensured understanding, invited questions and all questions were answered. Patient wishes to proceed.    Informed consent was obtained.   Patient tolerated well. Incremental aspiration every 5 mL. No paresthesia, no heme. Needle tip visualized throughout with appropriate spread of local anesthetic in adductor canal.   Block was placed at the surgeon's request for post operative pain control.          FOR [facility] ONLY:   Pain Team Contact information: please page the Pain Team Via [employer]. Search \"Pain\". During daytime hours, please page the attending first. At night please page the resident first.      "

## 2023-07-26 NOTE — ANESTHESIA POSTPROCEDURE EVALUATION
Patient: Muna Cobos    Procedure: Procedure(s):  Right anterior knee arthroscopic  synovectomy  Right Knee Open Posterior Synovectomy       Anesthesia Type:  General    Note:  Disposition: Outpatient   Postop Pain Control: Uneventful            Sign Out: Well controlled pain   PONV: Yes            Symptoms: Nausea + Vomiting            Sign Out: PONV/POV resolved with treatment   Neuro/Psych: Uneventful            Sign Out: Acceptable/Baseline neuro status   Airway/Respiratory: Uneventful            Sign Out: Acceptable/Baseline resp. status   CV/Hemodynamics: Uneventful            Sign Out: Acceptable CV status; No obvious hypovolemia; No obvious fluid overload   Other NRE: NONE   DID A NON-ROUTINE EVENT OCCUR? No    Event details/Postop Comments:  Nausea improved with compazine. Reports knee pain, that it is tolerable. Tolerating food and oral medications.           Last vitals:  Vitals Value Taken Time   /65 07/25/23 1745   Temp 37  C (98.6  F) 07/25/23 1603   Pulse 106 07/25/23 1754   Resp 44 07/25/23 1754   SpO2 95 % 07/25/23 1754   Vitals shown include unvalidated device data.    Electronically Signed By: Dorinda Heath MD  July 26, 2023  5:33 AM

## 2023-07-26 NOTE — OP NOTE
DATE OF SURGERY: 7/25/2023    PREOPERATIVE DIAGNOSIS: Right knee synovitis    POSTOPERATIVE DIAGNOSIS: Right knee synovial chondromatosis    PROCEDURE: Open posterior synovectomy right knee    SURGEON: Alexis Diane MD     ASSISTANT: Faizan Washington MD - Resident - Assisting     PATIENT HISTORY: This patient has a history of right knee swelling and significant synovitis.  There is some concern for tumor related synovitis and so she presents now to the operating room for arthroscopic exploration and synovectomy and possible open posterior synovectomy.  She understands the risks of infection bleeding pain numbness tingling stiffness deep venous thrombosis pulmonary embolism.    DESCRIPTION OF PROCEDURE: The patient underwent successful induction of anesthesia.  She was positioned by our sports surgeons and then underwent arthroscopic anterior knee synovectomy.  She had numerous small cartilage loose bodies in the knee and some areas of synovium that seem to be giving rise to some of these cartilage loose bodies.  She had a removal of the loose bodies and arthroscopic synovectomy in the front of the knee.  After the incisions were closed and covered with sterile dressing she was turned prone and then the right leg was reprepped and draped.  We made a posterior incision to the knee across the popliteal crease sharply and divided the subcutaneous tissue with cautery.  The flexor retinaculum and fascia were opened up and then we developed the plane between the gastrocnemius muscles.  I undermined the medial gastrocnemius muscle to get into the joint and incised the capsule.  On MRI there was a collection of synovium under the gastroc origin but we found very little abnormal synovium or loose bodies.  We did remove some synovium from the lateral gutter and the gastroc origin and a bit from behind the medial meniscus.  I then turned attention to the proximal tibiofibular joint where there is also some abnormalities  on MRI.  We reflected the lateral gastrocnemius off of the joint and then split the overlying muscle to enter the joint.  I cauterize some small bleeding vessels.  I used a pituitary rongeur to remove some material from the joint including synovium that may have contained some cartilage loose bodies.  There is little bulky synovitis here however.  We used this approach enlarging approximately to get up underneath the lateral meniscus into that space and were able to remove some hypertrophic synovium but very few loose bodies.  Next we again went into the popliteal area between the condyles and opened up the capsule laterally to expose the lateral condyle.  I removed some synovitis here but there are few loose bodies here as well.  We copiously irrigated the joint having explored all these areas.  We closed with 0 Vicryl in the retinaculum 2-0 Vicryl in the subcutaneous tissue Monocryl and the skin Steri-Strips and a sterile dry dressing.  The patient was then turned supine extubated and taken to the recovery room in stable condition.  The estimated blood loss is 15 mL.  There were no complications.  I was present for all critical portions of the procedure.  The specimen was sent to pathology in formalin.    Alexis Diane MD

## 2023-07-27 LAB
PATH REPORT.COMMENTS IMP SPEC: NORMAL
PATH REPORT.FINAL DX SPEC: NORMAL
PATH REPORT.GROSS SPEC: NORMAL
PATH REPORT.RELEVANT HX SPEC: NORMAL
PHOTO IMAGE: NORMAL

## 2023-07-29 ENCOUNTER — HOSPITAL ENCOUNTER (EMERGENCY)
Facility: CLINIC | Age: 23
Discharge: HOME | End: 2023-07-29
Attending: EMERGENCY MEDICINE | Admitting: EMERGENCY MEDICINE
Payer: COMMERCIAL

## 2023-07-29 ENCOUNTER — APPOINTMENT (OUTPATIENT)
Dept: ULTRASOUND IMAGING | Facility: CLINIC | Age: 23
End: 2023-07-29
Attending: NURSE PRACTITIONER
Payer: COMMERCIAL

## 2023-07-29 ENCOUNTER — TELEPHONE (OUTPATIENT)
Dept: NURSING | Facility: CLINIC | Age: 23
End: 2023-07-29
Payer: MEDICAID

## 2023-07-29 VITALS
WEIGHT: 182 LBS | RESPIRATION RATE: 18 BRPM | SYSTOLIC BLOOD PRESSURE: 111 MMHG | HEART RATE: 104 BPM | HEIGHT: 65 IN | DIASTOLIC BLOOD PRESSURE: 76 MMHG | TEMPERATURE: 98.4 F | BODY MASS INDEX: 30.32 KG/M2 | OXYGEN SATURATION: 98 %

## 2023-07-29 DIAGNOSIS — I82.401 ACUTE DEEP VEIN THROMBOSIS (DVT) OF RIGHT LOWER EXTREMITY, UNSPECIFIED VEIN (H): ICD-10-CM

## 2023-07-29 LAB
ALBUMIN SERPL BCG-MCNC: 4.2 G/DL (ref 3.5–5.2)
ALP SERPL-CCNC: 85 U/L (ref 35–104)
ALT SERPL W P-5'-P-CCNC: 41 U/L (ref 0–50)
ANION GAP SERPL CALCULATED.3IONS-SCNC: 10 MMOL/L (ref 7–15)
AST SERPL W P-5'-P-CCNC: 41 U/L (ref 0–45)
BASOPHILS # BLD AUTO: 0 10E3/UL (ref 0–0.2)
BASOPHILS NFR BLD AUTO: 0 %
BILIRUB SERPL-MCNC: 0.3 MG/DL
BUN SERPL-MCNC: 8.2 MG/DL (ref 6–20)
CALCIUM SERPL-MCNC: 9.4 MG/DL (ref 8.6–10)
CHLORIDE SERPL-SCNC: 103 MMOL/L (ref 98–107)
CREAT SERPL-MCNC: 0.57 MG/DL (ref 0.51–0.95)
CRP SERPL-MCNC: 19.89 MG/L
DEPRECATED HCO3 PLAS-SCNC: 24 MMOL/L (ref 22–29)
EOSINOPHIL # BLD AUTO: 0.1 10E3/UL (ref 0–0.7)
EOSINOPHIL NFR BLD AUTO: 1 %
ERYTHROCYTE [DISTWIDTH] IN BLOOD BY AUTOMATED COUNT: 13 % (ref 10–15)
ERYTHROCYTE [SEDIMENTATION RATE] IN BLOOD BY WESTERGREN METHOD: 26 MM/HR (ref 0–20)
GFR SERPL CREATININE-BSD FRML MDRD: >90 ML/MIN/1.73M2
GLUCOSE SERPL-MCNC: 137 MG/DL (ref 70–99)
HCT VFR BLD AUTO: 36 % (ref 35–47)
HGB BLD-MCNC: 12.2 G/DL (ref 11.7–15.7)
IMM GRANULOCYTES # BLD: 0 10E3/UL
IMM GRANULOCYTES NFR BLD: 0 %
LYMPHOCYTES # BLD AUTO: 1.9 10E3/UL (ref 0.8–5.3)
LYMPHOCYTES NFR BLD AUTO: 21 %
MCH RBC QN AUTO: 29.5 PG (ref 26.5–33)
MCHC RBC AUTO-ENTMCNC: 33.9 G/DL (ref 31.5–36.5)
MCV RBC AUTO: 87 FL (ref 78–100)
MONOCYTES # BLD AUTO: 0.4 10E3/UL (ref 0–1.3)
MONOCYTES NFR BLD AUTO: 4 %
NEUTROPHILS # BLD AUTO: 6.6 10E3/UL (ref 1.6–8.3)
NEUTROPHILS NFR BLD AUTO: 74 %
NRBC # BLD AUTO: 0 10E3/UL
NRBC BLD AUTO-RTO: 0 /100
PLATELET # BLD AUTO: 358 10E3/UL (ref 150–450)
POTASSIUM SERPL-SCNC: 3.9 MMOL/L (ref 3.4–5.3)
PROT SERPL-MCNC: 7.2 G/DL (ref 6.4–8.3)
RBC # BLD AUTO: 4.13 10E6/UL (ref 3.8–5.2)
SODIUM SERPL-SCNC: 137 MMOL/L (ref 136–145)
WBC # BLD AUTO: 9.1 10E3/UL (ref 4–11)

## 2023-07-29 PROCEDURE — 85652 RBC SED RATE AUTOMATED: CPT | Performed by: NURSE PRACTITIONER

## 2023-07-29 PROCEDURE — 250N000013 HC RX MED GY IP 250 OP 250 PS 637: Performed by: NURSE PRACTITIONER

## 2023-07-29 PROCEDURE — 86140 C-REACTIVE PROTEIN: CPT | Performed by: NURSE PRACTITIONER

## 2023-07-29 PROCEDURE — 99284 EMERGENCY DEPT VISIT MOD MDM: CPT | Mod: 25 | Performed by: EMERGENCY MEDICINE

## 2023-07-29 PROCEDURE — 250N000013 HC RX MED GY IP 250 OP 250 PS 637: Performed by: EMERGENCY MEDICINE

## 2023-07-29 PROCEDURE — 36415 COLL VENOUS BLD VENIPUNCTURE: CPT | Performed by: NURSE PRACTITIONER

## 2023-07-29 PROCEDURE — 80053 COMPREHEN METABOLIC PANEL: CPT | Performed by: NURSE PRACTITIONER

## 2023-07-29 PROCEDURE — 99285 EMERGENCY DEPT VISIT HI MDM: CPT | Mod: FS | Performed by: EMERGENCY MEDICINE

## 2023-07-29 PROCEDURE — 85025 COMPLETE CBC W/AUTO DIFF WBC: CPT | Performed by: NURSE PRACTITIONER

## 2023-07-29 PROCEDURE — 93971 EXTREMITY STUDY: CPT | Mod: RT

## 2023-07-29 RX ORDER — APIXABAN 5 MG (74)
KIT ORAL
Qty: 74 EACH | Refills: 0 | Status: SHIPPED | OUTPATIENT
Start: 2023-07-29 | End: 2023-08-28

## 2023-07-29 RX ORDER — OXYCODONE HYDROCHLORIDE 5 MG/1
5 TABLET ORAL ONCE
Status: COMPLETED | OUTPATIENT
Start: 2023-07-29 | End: 2023-07-29

## 2023-07-29 RX ORDER — ACETAMINOPHEN 500 MG
1000 TABLET ORAL ONCE
Status: COMPLETED | OUTPATIENT
Start: 2023-07-29 | End: 2023-07-29

## 2023-07-29 RX ADMIN — OXYCODONE HYDROCHLORIDE 5 MG: 5 TABLET ORAL at 20:38

## 2023-07-29 RX ADMIN — APIXABAN 10 MG: 5 TABLET, FILM COATED ORAL at 20:38

## 2023-07-29 RX ADMIN — ACETAMINOPHEN 1000 MG: 500 TABLET, FILM COATED ORAL at 16:01

## 2023-07-29 RX ADMIN — OXYCODONE HYDROCHLORIDE 5 MG: 5 TABLET ORAL at 16:01

## 2023-07-29 ASSESSMENT — ACTIVITIES OF DAILY LIVING (ADL)
ADLS_ACUITY_SCORE: 35

## 2023-07-29 NOTE — TELEPHONE ENCOUNTER
Patient calling, states that she had surgery on her right knee about 4 days ago. She states that she is having a increase in swelling and pain. She states that she is at the hospital now. Patient will be seen in the ED. No triage.     LUZ ALVAREZ RN

## 2023-07-29 NOTE — ED TRIAGE NOTES
R knee surgery on tues 7/25. Increase swelling noticed yesterday.  low grade fever this AM at 99.2F. pt also reports that she never regained full sensation in RLE after operation- N/T.     Pt took 650mg tylenol, and 5mg oxycodone at 10am     Triage Assessment       Row Name 07/29/23 1200       Triage Assessment (Adult)    Airway WDL WDL       Respiratory WDL    Respiratory WDL WDL       Skin Circulation/Temperature WDL    Skin Circulation/Temperature WDL --  RLE ACE bandaged from thigh to ankle. pt states 3 incisions to R knee from surgery. bandage CDI       Cardiac WDL    Cardiac WDL WDL       Peripheral/Neurovascular WDL    Peripheral Neurovascular WDL WDL       Cognitive/Neuro/Behavioral WDL    Cognitive/Neuro/Behavioral WDL WDL

## 2023-07-29 NOTE — DISCHARGE INSTRUCTIONS
TODAY'S VISIT:  You were seen today for increased leg swelling and pain  -The ultrasound confirmed that you do have a deep vein thrombosis or blood clot in the vein from approximately your knee to your mid calf on the backside of your leg.  - If you had any labs or imaging/radiology tests performed today, you should also discuss these tests with your usual provider.     FOLLOW-UP:  Please make an appointment to follow up with:  - Your Primary Care Provider. If you do not have a PCP, please call the Primary Care Center (phone: [phone]) for an appointment.  They can continue to prescribe the anticoagulant medication for you.  - Orthopedics Clinic (phone: [phone]) as previously scheduled on August 9.    - Have your provider review the results from today's visit with you again to make sure no further follow-up or additional testing is needed based on those results.     PRESCRIPTIONS / MEDICATIONS:  - and start the apixaban (Eliquis) prescription, you will take a higher dose for the first 7 days and then will take a lower dose from then on until your primary doctor tells you to stop taking this.  Typically this is taken for 3 months or longer, but that is a discussion you to have with your primary care provider.  - we gave you your first dose of eliquis here in the emergency department    OTHER INSTRUCTIONS:  -Stop taking the aspirin  -Read the attached instructions about deep vein thrombosis    RETURN TO THE EMERGENCY DEPARTMENT  Return to the Emergency Department if you develop increased pain or swelling, increased redness of your calf or around the incision site, shortness of breath or chest pain or at any time for any new or worsening symptoms or any concerns.    [facility]    [address] [facility] [phone] [address] [facility] [phone] [address] [place] [phone] [address] [facility] [place] [phone]    [address],  [facility] [phone]

## 2023-07-29 NOTE — CONSULTS
[facility]  Orthopedic Surgery Consult    Name: Muna Cobos  Age: 23 year old  MRN: [id number]  YOB: 2000    Reason for Consult: Postoperative numbness    Requesting Provider: Sandy Aguirre MD    Assessment and Plan:     Assessment:  23-year-old female who underwent right knee arthroscopy and posterior synovectomy with Ceasar Xiong and Roxi on 07/25 who presented today with right lower extremity swelling and numbness.    She received an adductor canal block postoperatively for pain.  Her numbness in the saphenous sural and superficial peroneal nerve distributions are consistent with his postoperative nerve block.     Right lower extremity ultrasound today showed DVT of peroneal veins which would explain her RLE swelling.  She was on ASA 162mg Qdx4 weeks postoperatively. We agree with ED to start on DOAC. She can maintain her follow-up outpatient with Dr. Xiong on 08/09.     We will sign off on this patient at this time please reconsult us with any questions/concerns or if clinical course changes.      Plan:  - Plan for OR: None  - Anticoagulation/DVT prophylaxis: DOAC per ED  - Antibiotics: None needed  - Imaging: RLE US today  - Activity: As tolerated  - Weight bearing: WBAT  - Pain control: OTC oral medications  - Diet: Cleared from our standpoint  - Follow-up: Dr. Xiong 08/09  - Disposition: Home    Staff: Shelby Mitchell MD    Respectfully,    Oskar Harrington MD  Orthopedic Surgery PGY1  [phone]    Please page me directly with any questions/concerns during regular weekday hours before 5 pm. If there is no response, if it is a weekend, or if it is during evening hours then please page the orthopedic surgery resident on call.    History of Present Illness:     Patient was seen and examined by me. History, PMH, Meds, SH, complete ROS (10 organ systems) and PE reviewed with patient and prior medical records.      23-year-old female that had a right knee  arthroscopy and posterior synovectomy with Dr. Xiong and Dr. Diane the on 07/20/2023 presented today to the ED with concern of right lower extremity swelling and numbness.  She states that she had postoperative pain in her ankle starting on 07/27/2023 and woke up this morning with swelling past her right knee.  She states that she has been elevating the extremity.  She denies any chest pain or shortness of breath.     Past Medical History:     No past medical history on file.    Past Surgical History:     Past Surgical History:   Procedure Laterality Date    ARTHROSCOPY KNEE Right 7/25/2023    Procedure: Right anterior knee arthroscopic;  Surgeon: Pj Xiong MD;  Location: [facility] OR    LAPAROSCOPIC APPENDECTOMY N/A 5/8/2016    Procedure: LAPAROSCOPIC APPENDECTOMY;  Surgeon: Jesus Joiner MD;  Location: [facility] OR    SYNOVECTOMY KNEE Right 7/25/2023    Procedure: synovectomy;  Surgeon: Pj Xiong MD;  Location: [facility] OR    SYNOVECTOMY KNEE POSTERIOR Right 7/25/2023    Procedure: Right Knee Open Posterior Synovectomy;  Surgeon: Alexis Diane MD;  Location: [facility] OR       Social History:     Social History     Socioeconomic History    Marital status: Single   Tobacco Use    Smoking status: Never   Substance and Sexual Activity    Drug use: Not Currently       Family History:     No family history on file.    Medications:     No current facility-administered medications for this encounter.     Current Outpatient Medications   Medication Sig    acetaminophen (TYLENOL) 325 MG tablet Take 2 tablets (650 mg) by mouth every 4 hours as needed for mild pain    aspirin 81 MG EC tablet Take 2 tablets (162 mg) by mouth daily    oxyCODONE (ROXICODONE) 5 MG tablet Take 1-2 tablets (5-10 mg) by mouth every 4 hours as needed for moderate to severe pain    amoxicillin-clavulanate (AUGMENTIN) 875-125 MG per tablet Take 1 tablet by mouth 2 times daily (Patient not taking: Reported on  "7/29/2023)       Allergies:     No Known Allergies    Review of Systems:     A comprehensive 10 point review of systems (constitutional, ENT, cardiac, peripheral vascular, respiratory, GI, , musculoskeletal, skin, neurological) was performed and found to be negative except as described in this note.      Physical Exam:     Vital Signs: /81   Pulse 91   Temp 98.3  F (36.8  C) (Oral)   Resp 18   Ht 1.651 m (5' 5\")   Wt 82.6 kg (182 lb)   LMP 07/23/2023 (Approximate)   SpO2 96%   BMI 30.29 kg/m    General: Resting comfortably in bed, awake, alert, no apparent distress, appears stated age.    Musculoskeletal:  RLE: No gross deformity. Skin intact. Knee active ROM limited from 0-90. Has non-pitting edema of calf and TTP. No erythema. Fires Quad/TA/Gastroc/EHL/FHL. SILT in femoral, deep peroneal, and tibial nerve distributions. Decreased sensation to light touch in saphenous, sural, and superficial peroneal distributions. Dorsalis pedis and posterior tibial arteries 2+ and foot warm and well-perfused with <2 seconds capillary refill.     Imaging:     RLE US reveals DVT of peroneal veins    Data:     CBC:  Lab Results   Component Value Date    WBC 9.1 07/29/2023    HGB 12.2 07/29/2023     07/29/2023     BMP:  Lab Results   Component Value Date     07/29/2023    POTASSIUM 3.9 07/29/2023    CHLORIDE 103 07/29/2023    CO2 24 07/29/2023    BUN 8.2 07/29/2023    CR 0.57 07/29/2023    ANIONGAP 10 07/29/2023    ORIANA 9.4 07/29/2023     (H) 07/29/2023     Inflammatory Markers:  Lab Results   Component Value Date    WBC 9.1 07/29/2023    WBC 12.6 (H) 07/22/2022    SED 26 (H) 07/29/2023      "

## 2023-07-29 NOTE — ED PROVIDER NOTES
[facility] [place] [facility] ([facility])    7/29/23      ED PROVIDER NOTE    History     Chief Complaint   Patient presents with    Post-op Problem     R knee surgery on tues 7/25. Increase swelling noticed yesterday.  low grade fever this AM at 99.2F. pt also reports that she never regained full sensation in RLE after operation- N/T.      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  Patient reports this morning she noticed that her right foot and ankle were very swollen for the first time, states that the ankle is also painful.  Also notes that she has not regained sensation on the front of her shin or the top surface of her foot from her pinky to her ankle since the surgery.  She states she has been ambulating weightbearing as tolerated with crutches and resting and elevating the leg is much as possible.  She denies any increased pain to her knee, has not noticed any increased drainage from her incision sites.  Has not yet unwrapped her dressing since surgery.  Denies any fevers or chills, no nausea or vomiting.  Denies any falls or trauma to the leg since surgery.  Been taking pain medication around-the-clock with relief of her pain.    Past Medical History  No past medical history on file.  Past Surgical History:   Procedure Laterality Date    ARTHROSCOPY KNEE Right 7/25/2023    Procedure: Right anterior knee arthroscopic;  Surgeon: Pj Xiong MD;  Location: [facility] OR    LAPAROSCOPIC APPENDECTOMY N/A 5/8/2016    Procedure: LAPAROSCOPIC APPENDECTOMY;  Surgeon: Jesus Joiner MD;  Location: [facility] OR    SYNOVECTOMY KNEE Right 7/25/2023    Procedure: synovectomy;  Surgeon: Pj Xiong MD;  Location: [facility] OR    SYNOVECTOMY KNEE POSTERIOR Right 7/25/2023    Procedure: Right Knee Open  "Posterior Synovectomy;  Surgeon: Alexis Diane MD;  Location: [facility] OR     acetaminophen (TYLENOL) 325 MG tablet  Apixaban Starter Pack (ELIQUIS DVT/PE STARTER PACK) 5 MG TBPK  oxyCODONE (ROXICODONE) 5 MG tablet  amoxicillin-clavulanate (AUGMENTIN) 875-125 MG per tablet      No Known Allergies  Family History  No family history on file.  Social History   Social History     Tobacco Use    Smoking status: Never   Substance Use Topics    Drug use: Not Currently      Past medical history, past surgical history, medications, allergies, family history, and social history were reviewed with the patient. No additional pertinent items.      A medically appropriate review of systems was performed with pertinent positives and negatives noted in the HPI, and all other systems negative.    Physical Exam   BP: 120/81  Pulse: 91  Temp: 98.3  F (36.8  C)  Resp: 18  Height: 165.1 cm (5' 5\")  Weight: 82.6 kg (182 lb)  SpO2: 96 %  Physical Exam  Vitals and nursing note reviewed.   Constitutional:       Appearance: Normal appearance.   HENT:      Head: Normocephalic and atraumatic.   Musculoskeletal:      Comments: Right ankle +3 edema.  Pulses and capillary refill intact.  Has full sensation in the leg and foot but notes a sensation difference on the dorsal surface of her right foot as well as on her right shin.   Skin:     General: Skin is warm and dry.      Capillary Refill: Capillary refill takes less than 2 seconds.      Findings: No bruising, erythema, lesion or rash.   Neurological:      General: No focal deficit present.      Mental Status: She is alert and oriented to person, place, and time.   Psychiatric:         Mood and Affect: Mood normal.         Behavior: Behavior normal.           ED Course, Procedures, & Data      Procedures       Results for orders placed or performed during the hospital encounter of 07/29/23   US Lower Extremity Venous Duplex Right     Status: None    Narrative    EXAM: US LOWER EXTREMITY " VENOUS DUPLEX RIGHT  LOCATION: [facility]  DATE: 7/29/2023    INDICATION: RLE evaluate for DVT   post operative right knee 4 days ago with increased distal swelling  COMPARISON: None.  TECHNIQUE: Venous Duplex ultrasound of the right lower extremity with and without compression, augmentation and duplex. Color flow and spectral Doppler with waveform analysis performed.    FINDINGS: Exam includes the common femoral, femoral, and contralateral common femoral veins as well as segmentally visualized deep calf veins and greater saphenous vein. Popliteal vein is not well visualized due to overlying incision/bandage.    RIGHT: Occlusive thrombus noted in the peroneal veins extending from the proximal to mid calf. No definite deep vein thrombus above the knee. No superficial thrombophlebitis. No popliteal cyst. Mild subcutaneous edema.      Impression    IMPRESSION:  1.  Occlusive deep vein thrombus in the peroneal veins from the proximal to mid calf.  2.  Popliteal vein not well visualized due to overlying incision/bandage.   Comprehensive metabolic panel     Status: Abnormal   Result Value Ref Range    Sodium 137 136 - 145 mmol/L    Potassium 3.9 3.4 - 5.3 mmol/L    Chloride 103 98 - 107 mmol/L    Carbon Dioxide (CO2) 24 22 - 29 mmol/L    Anion Gap 10 7 - 15 mmol/L    Urea Nitrogen 8.2 6.0 - 20.0 mg/dL    Creatinine 0.57 0.51 - 0.95 mg/dL    Calcium 9.4 8.6 - 10.0 mg/dL    Glucose 137 (H) 70 - 99 mg/dL    Alkaline Phosphatase 85 35 - 104 U/L    AST 41 0 - 45 U/L    ALT 41 0 - 50 U/L    Protein Total 7.2 6.4 - 8.3 g/dL    Albumin 4.2 3.5 - 5.2 g/dL    Bilirubin Total 0.3 <=1.2 mg/dL    GFR Estimate >90 >60 mL/min/1.73m2   CRP inflammation     Status: Abnormal   Result Value Ref Range    CRP Inflammation 19.89 (H) <5.00 mg/L   Erythrocyte sedimentation rate auto     Status: Abnormal   Result Value Ref Range    Erythrocyte Sedimentation Rate 26 (H) 0 - 20 mm/hr   CBC with  platelets and differential     Status: None   Result Value Ref Range    WBC Count 9.1 4.0 - 11.0 10e3/uL    RBC Count 4.13 3.80 - 5.20 10e6/uL    Hemoglobin 12.2 11.7 - 15.7 g/dL    Hematocrit 36.0 35.0 - 47.0 %    MCV 87 78 - 100 fL    MCH 29.5 26.5 - 33.0 pg    MCHC 33.9 31.5 - 36.5 g/dL    RDW 13.0 10.0 - 15.0 %    Platelet Count 358 150 - 450 10e3/uL    % Neutrophils 74 %    % Lymphocytes 21 %    % Monocytes 4 %    % Eosinophils 1 %    % Basophils 0 %    % Immature Granulocytes 0 %    NRBCs per 100 WBC 0 <1 /100    Absolute Neutrophils 6.6 1.6 - 8.3 10e3/uL    Absolute Lymphocytes 1.9 0.8 - 5.3 10e3/uL    Absolute Monocytes 0.4 0.0 - 1.3 10e3/uL    Absolute Eosinophils 0.1 0.0 - 0.7 10e3/uL    Absolute Basophils 0.0 0.0 - 0.2 10e3/uL    Absolute Immature Granulocytes 0.0 <=0.4 10e3/uL    Absolute NRBCs 0.0 10e3/uL   CBC with platelets differential     Status: None    Narrative    The following orders were created for panel order CBC with platelets differential.  Procedure                               Abnormality         Status                     ---------                               -----------         ------                     CBC with platelets and d...[[id number]]                      Final result                 Please view results for these tests on the individual orders.     Medications   oxyCODONE (ROXICODONE) tablet 5 mg (5 mg Oral $Given 7/29/23 1601)   acetaminophen (TYLENOL) tablet 1,000 mg (1,000 mg Oral $Given 7/29/23 1601)   apixaban ANTICOAGULANT (ELIQUIS) tablet 10 mg (10 mg Oral $Given 7/29/23 2038)   oxyCODONE (ROXICODONE) tablet 5 mg (5 mg Oral $Given 7/29/23 2038)     Labs Ordered and Resulted from Time of ED Arrival to Time of ED Departure   COMPREHENSIVE METABOLIC PANEL - Abnormal       Result Value    Sodium 137      Potassium 3.9      Chloride 103      Carbon Dioxide (CO2) 24      Anion Gap 10      Urea Nitrogen 8.2      Creatinine 0.57      Calcium 9.4      Glucose 137 (*)      Alkaline Phosphatase 85      AST 41      ALT 41      Protein Total 7.2      Albumin 4.2      Bilirubin Total 0.3      GFR Estimate >90     CRP INFLAMMATION - Abnormal    CRP Inflammation 19.89 (*)    ERYTHROCYTE SEDIMENTATION RATE AUTO - Abnormal    Erythrocyte Sedimentation Rate 26 (*)    CBC WITH PLATELETS AND DIFFERENTIAL    WBC Count 9.1      RBC Count 4.13      Hemoglobin 12.2      Hematocrit 36.0      MCV 87      MCH 29.5      MCHC 33.9      RDW 13.0      Platelet Count 358      % Neutrophils 74      % Lymphocytes 21      % Monocytes 4      % Eosinophils 1      % Basophils 0      % Immature Granulocytes 0      NRBCs per 100 WBC 0      Absolute Neutrophils 6.6      Absolute Lymphocytes 1.9      Absolute Monocytes 0.4      Absolute Eosinophils 0.1      Absolute Basophils 0.0      Absolute Immature Granulocytes 0.0      Absolute NRBCs 0.0       US Lower Extremity Venous Duplex Right   Final Result   IMPRESSION:   1.  Occlusive deep vein thrombus in the peroneal veins from the proximal to mid calf.   2.  Popliteal vein not well visualized due to overlying incision/bandage.      POC US FOR DVT UNILATERAL LIMITED    (Results Pending)          Critical care was not performed.     Medical Decision Making  The patient's presentation was of moderate complexity (an acute illness with systemic symptoms).    The patient's evaluation involved:  review of external note(s) from 3+ sources (see separate area of note for details)  ordering and/or review of 3+ test(s) in this encounter (see separate area of note for details)  review of 3+ test result(s) ordered prior to this encounter (see separate area of note for details)  independent interpretation of testing performed by another health professional (right lower extremity ultrasound)  discussion of management or test interpretation with another health professional (orthopedics)    The patient's management necessitated moderate risk (prescription drug management including  medications given in the ED).    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 Differential includes DVT, normal swelling, internal ongoing bleeding amongst others.     Discussed with orthopedics who is aware that the patient would be coming in.  They stated they did expect patient to have some significant swelling following this procedure but they will come and evaluate the patient in the emergency department.  In the meantime we will collect comprehensive labs as well as send patient for DVT ultrasound right lower extremity per orthopedics recommendations.    Reviewed patient's labs which were remarkable for a slight increase in her CRP and ESR, no leukocytosis, no anemia or signs of bleeding, no electrolyte or metabolic abnormalities.    I reviewed the ultrasound imaging as well as per the radiologist report which is positive for a DVT in RLE distal to knee. Discussed with orthopedics, who recommended DOAC (as well as stopping aspirin) and follow-up as previously scheduled in orthopedic clinic on August 9.    IMPRESSION:  1.  Occlusive deep vein thrombus in the peroneal veins from the proximal to mid calf.  2.  Popliteal vein not well visualized due to overlying incision/bandage.    Discussed results with the patient as well as her father, discussed treatment with Eliquis and prescribed starter pack.  Patient given first dose in the emergency department prior to discharge.  Emergency department patient also received Tylenol and oxycodone which are her home medications to treat her knee pain since surgery.  Discussed close return precautions with patient including return to the emergency department should she develop increased pain and swelling change in color or sensation of her foot or leg, increased  bleeding from her incision, shortness of breath or chest pain or any other new or worrisome symptoms.    I have reviewed the nursing notes. I have reviewed the findings, diagnosis, plan and need for follow up with the patient.  Patient was in agreement with the plan for discharge, she verbalized understanding with the medications and plan was discharged from the emergency department.    Discharge Medication List as of 7/29/2023  7:16 PM        START taking these medications    Details   Apixaban Starter Pack (ELIQUIS DVT/PE STARTER PACK) 5 MG TBPK Take 10 mg by mouth 2 times daily for 7 days, THEN 5 mg 2 times daily for 23 days., Disp-74 each, R-0, Local Print             Final diagnoses:   Acute deep vein thrombosis (DVT) of right lower extremity, unspecified vein (H)       DORYS Suggs CNP  [facility] EMERGENCY DEPARTMENT  7/29/2023     Asuncion Srivastava APRN CNP  07/29/23 [id number]

## 2023-07-30 NOTE — ED NOTES
Telephone note:  Call from [facility] pharmacy at 1130 am on 7/30/2023--- they do not have no Eliquis starting pack that was prescribed yesterday.  They will use the Eliquis bottle and patient is instructed to take 2 tabs twice daily for 7 days followed by 1 tablet twice daily for 23 days.  MD Timothy Green Alda L, MD  07/30/23 [id number]

## 2023-08-09 ENCOUNTER — OFFICE VISIT (OUTPATIENT)
Dept: ORTHOPEDICS | Facility: CLINIC | Age: 23
End: 2023-08-09
Payer: COMMERCIAL

## 2023-08-09 DIAGNOSIS — M65.969 SYNOVITIS OF KNEE: Primary | ICD-10-CM

## 2023-08-09 PROCEDURE — 99024 POSTOP FOLLOW-UP VISIT: CPT | Performed by: ORTHOPAEDIC SURGERY

## 2023-08-09 NOTE — LETTER
8/9/2023         RE: Muna Cobos  [address]  [facility] [id number]        Dear Colleague,    Thank you for referring your patient, Muna Cobos, to the [facility] [place]. Please see a copy of my visit note below.    Orthopedic surgery follow-up note    Patient is a 23-year-old female here 2 weeks status post right knee arthroscopic synovectomy as well as posterior open synovectomy on 7/25/2023.  Her postop course was complicated by DVT to her operative extremity as well as emboli to her lungs.  She is currently on Eliquis.  She states that within the last couple days to weeks she has noticed dramatic improvement in her knee pain as well as swelling to her operative extremity.  She has been able to ambulate without the use of assistive devices.  She notes that her knee is quite stiff and has been apprehensive to perform range of motion.  She denies any numbness or tingling.  Reports that the incisions have been healing well.    Exam:  No acute distress.  Nonlabored breathing, regular rate and rhythm.  Right knee with well-healed incisions.  Steri-Strips to the posterior incision without drainage.  No surrounding erythema to suggest infection.  Mild to moderate swelling throughout right lower extremity with some edema distally.  Knee range of motion measured with goniometer with 20 degrees shy of full extension to 65 degrees of flexion.  Sensation intact to light touch throughout.  Foot warm and well-perfused.    Imaging:  No new imaging obtained today.    Assessment/Plan:  23-year-old female 2-week status post right knee arthroscopic synovectomy and posterior open synovectomy on 7/25/2023.  Pathology nondiagnostic for tenosynovial giant cell tumor or synovial chondromatosis.  Given this, we will plan to workup for inflammatory type conditions such as RA, however would like to wait to obtain these labs until she is further out from surgery.  Will plan to obtain them on the next  clinic visit in approximately 2 months.  We discussed the importance of working on knee range of motion.  Given a prescription for physical therapy to work on this.  She has no limitations and can be weightbearing as tolerated as well as range of motion as tolerated to her knee.  Sutures to the anterior aspect removed in clinic today.  Posterior incision with Steri-Strips overlying observable sutures.  She was counseled that these will fall off over time.  Will plan to see her back in 2 months to recheck knee range of motion as well as possibly obtained inflammatory labs to check for other possibilities causing her knee pain, swelling, and synovitis.  Patient in agreement with plan.  All questions answered.    Patient seen and examined with Dr. Roxi Washington MD  Orthopedic Surgery, PGY-4    I was present with the resident during the history and exam.  I discussed the case with the resident and agree with the findings as documented in the assessment and plan.        Alexis Diane MD

## 2023-08-09 NOTE — PROGRESS NOTES
Orthopedic surgery follow-up note    Patient is a 23-year-old female here 2 weeks status post right knee arthroscopic synovectomy as well as posterior open synovectomy on 7/25/2023.  Her postop course was complicated by DVT to her operative extremity as well as emboli to her lungs.  She is currently on Eliquis.  She states that within the last couple days to weeks she has noticed dramatic improvement in her knee pain as well as swelling to her operative extremity.  She has been able to ambulate without the use of assistive devices.  She notes that her knee is quite stiff and has been apprehensive to perform range of motion.  She denies any numbness or tingling.  Reports that the incisions have been healing well.    Exam:  No acute distress.  Nonlabored breathing, regular rate and rhythm.  Right knee with well-healed incisions.  Steri-Strips to the posterior incision without drainage.  No surrounding erythema to suggest infection.  Mild to moderate swelling throughout right lower extremity with some edema distally.  Knee range of motion measured with goniometer with 20 degrees shy of full extension to 65 degrees of flexion.  Sensation intact to light touch throughout.  Foot warm and well-perfused.    Imaging:  No new imaging obtained today.    Assessment/Plan:  23-year-old female 2-week status post right knee arthroscopic synovectomy and posterior open synovectomy on 7/25/2023.  Pathology nondiagnostic for tenosynovial giant cell tumor or synovial chondromatosis.  Given this, we will plan to workup for inflammatory type conditions such as RA, however would like to wait to obtain these labs until she is further out from surgery.  Will plan to obtain them on the next clinic visit in approximately 2 months.  We discussed the importance of working on knee range of motion.  Given a prescription for physical therapy to work on this.  She has no limitations and can be weightbearing as tolerated as well as range of motion as  tolerated to her knee.  Sutures to the anterior aspect removed in clinic today.  Posterior incision with Steri-Strips overlying observable sutures.  She was counseled that these will fall off over time.  Will plan to see her back in 2 months to recheck knee range of motion as well as possibly obtained inflammatory labs to check for other possibilities causing her knee pain, swelling, and synovitis.  Patient in agreement with plan.  All questions answered.    Patient seen and examined with Dr. Roxi Washington MD  Orthopedic Surgery, PGY-4

## 2023-08-09 NOTE — NURSING NOTE
Reason For Visit:   Chief Complaint   Patient presents with    Surgical Followup     2 wk post-op Right Knee Open Posterior Synovectomy DOS 7/25/23       LMP 07/23/2023 (Approximate)     Pain Assessment  Patient Currently in Pain: Yes  0-10 Pain Scale: 3  Primary Pain Location: Knee (right)      Jose Manuel Kellogg ATC

## 2023-08-31 ENCOUNTER — DOCUMENTATION ONLY (OUTPATIENT)
Dept: ORTHOPEDICS | Facility: CLINIC | Age: 23
End: 2023-08-31
Payer: MEDICAID

## 2023-08-31 DIAGNOSIS — R26.89 IMPAIRED GAIT AND MOBILITY: Primary | ICD-10-CM

## 2023-10-10 ENCOUNTER — DOCUMENTATION ONLY (OUTPATIENT)
Dept: ORTHOPEDICS | Facility: CLINIC | Age: 23
End: 2023-10-10
Payer: MEDICAID

## 2023-10-10 DIAGNOSIS — M65.969 SYNOVITIS OF KNEE: Primary | ICD-10-CM

## 2023-10-11 ENCOUNTER — LAB (OUTPATIENT)
Dept: LAB | Facility: CLINIC | Age: 23
End: 2023-10-11
Payer: COMMERCIAL

## 2023-10-11 ENCOUNTER — OFFICE VISIT (OUTPATIENT)
Dept: ORTHOPEDICS | Facility: CLINIC | Age: 23
End: 2023-10-11
Payer: COMMERCIAL

## 2023-10-11 DIAGNOSIS — M65.969 SYNOVITIS OF KNEE: Primary | ICD-10-CM

## 2023-10-11 DIAGNOSIS — M65.969 SYNOVITIS OF KNEE: ICD-10-CM

## 2023-10-11 LAB
BASO+EOS+MONOS # BLD AUTO: NORMAL 10*3/UL
BASO+EOS+MONOS NFR BLD AUTO: NORMAL %
BASOPHILS # BLD AUTO: 0 10E3/UL (ref 0–0.2)
BASOPHILS NFR BLD AUTO: 0 %
CRP SERPL-MCNC: <3 MG/L
EOSINOPHIL # BLD AUTO: 0.2 10E3/UL (ref 0–0.7)
EOSINOPHIL NFR BLD AUTO: 2 %
ERYTHROCYTE [DISTWIDTH] IN BLOOD BY AUTOMATED COUNT: 13.3 % (ref 10–15)
ERYTHROCYTE [SEDIMENTATION RATE] IN BLOOD BY WESTERGREN METHOD: 12 MM/HR (ref 0–20)
HCT VFR BLD AUTO: 39.7 % (ref 35–47)
HGB BLD-MCNC: 13.3 G/DL (ref 11.7–15.7)
IMM GRANULOCYTES # BLD: 0 10E3/UL
IMM GRANULOCYTES NFR BLD: 0 %
LYMPHOCYTES # BLD AUTO: 3.4 10E3/UL (ref 0.8–5.3)
LYMPHOCYTES NFR BLD AUTO: 35 %
MCH RBC QN AUTO: 29.4 PG (ref 26.5–33)
MCHC RBC AUTO-ENTMCNC: 33.5 G/DL (ref 31.5–36.5)
MCV RBC AUTO: 88 FL (ref 78–100)
MONOCYTES # BLD AUTO: 0.6 10E3/UL (ref 0–1.3)
MONOCYTES NFR BLD AUTO: 6 %
NEUTROPHILS # BLD AUTO: 5.5 10E3/UL (ref 1.6–8.3)
NEUTROPHILS NFR BLD AUTO: 57 %
NRBC # BLD AUTO: 0 10E3/UL
NRBC BLD AUTO-RTO: 0 /100
PLATELET # BLD AUTO: 446 10E3/UL (ref 150–450)
RBC # BLD AUTO: 4.53 10E6/UL (ref 3.8–5.2)
WBC # BLD AUTO: 9.7 10E3/UL (ref 4–11)

## 2023-10-11 PROCEDURE — 36415 COLL VENOUS BLD VENIPUNCTURE: CPT | Performed by: PATHOLOGY

## 2023-10-11 PROCEDURE — 85025 COMPLETE CBC W/AUTO DIFF WBC: CPT | Performed by: PATHOLOGY

## 2023-10-11 PROCEDURE — 99024 POSTOP FOLLOW-UP VISIT: CPT | Performed by: ORTHOPAEDIC SURGERY

## 2023-10-11 PROCEDURE — 85652 RBC SED RATE AUTOMATED: CPT | Performed by: PATHOLOGY

## 2023-10-11 PROCEDURE — 86140 C-REACTIVE PROTEIN: CPT | Performed by: PATHOLOGY

## 2023-10-11 RX ORDER — APIXABAN 5 MG/1
5 TABLET, FILM COATED ORAL 2 TIMES DAILY
COMMUNITY

## 2023-10-11 NOTE — NURSING NOTE
Reason For Visit:   Chief Complaint   Patient presents with    RECHECK     Progress check right knee // review lab results       There were no vitals taken for this visit.    Pain Assessment  Patient Currently in Pain: Yes  0-10 Pain Scale: 2  Primary Pain Location: Knee (right)      Jose Manuel Kellogg ATC

## 2023-10-11 NOTE — PROGRESS NOTES
"Orthopedic clinic note    DATE OF SURGERY: 7/25/2023  Surgery: Anterior arthroscopy with synovectomy and posterior open synovectomy  SURGEON: Dr. Diane, Dr. Xiong     Postoperative course notable for DVT and PE. Patient currently on eliquis.    Subj: Muna presents today for follow-up.  She is a couple months out from above procedures.  Last time we saw her she was still having quite a bit of pain and swelling in her right, operative, knee.  Today, she presents with her young child and reports that overall she is doing much better.  She has an occasional \"twinge\" in her knee.  Otherwise she has not had significant effusion and is able to do all of her activities.  She has questions for what to expect in the future, for example, the likelihood of recurrence.  The pathology report at time of procedure noted hypertrophic synovium but did not definitively diagnose synovial chondromatosis or TSGCT.  She has also had dense numbness in the saphenous distribution since the time of surgery.  No motor deficits.    Of note, she has a family history of gout in her father, rheumatoid arthritis in her mother, lupus in her Grandmother.    Objective:  No acute distress  Nonlabored breathing on room air  Musculoskeletal: Focused exam of the right lower extremity demonstrates well-healed surgical incisions on the anterior and posterior knee.  Her range of motion is full extension to about 120 degrees of flexion.  She is able to ambulate without an assistive device and without a limp. She is neurovascularly intact in her foot.  She notes dense numbness on the medial aspect of the lower leg, in the saphenous distribution.  She is grossly neurovascularly intact in the foot.    Imaging: No new    Assessment/plan:  Muna is a 23-year-old female who is approximately 10 weeks out from anterior and posterior synovectomy of the right knee.    She is doing well.  Her pain and swelling have resolved.  The pathology results were relatively " inconclusive.  She does have significant rheumatologic family history.  We will refer her for further rheumatologic work-up.  She can continue activity as tolerated.  We counseled her that the likelihood of recurrence is dependent on the synovial disease recurring which can happen but risk may be lowered with appropriate rheumatologic therapy.  Above discussed and patient amenable to rheum referral. She lives near [place] and requests referral closer to home which is very reasonable. We will fax the consult request.    She will follow up with us on an as needed basis.    Patient seen and discussed with Dr. Diane.    Muna Hester, PGY-4

## 2023-10-11 NOTE — LETTER
"    10/11/2023         RE: Muna Cobos  [address]  [facility] [address]        Dear Colleague,    Thank you for referring your patient, Muna Cobos, to the [facility] [place]. Please see a copy of my visit note below.    I was present with the resident during the history and exam.  I discussed the case with the resident and agree with the findings as documented in the assessment and plan.    Orthopedic clinic note    DATE OF SURGERY: 7/25/2023  Surgery: Anterior arthroscopy with synovectomy and posterior open synovectomy  SURGEON: Dr. Diane, Dr. Xiong     Postoperative course notable for DVT and PE. Patient currently on eliquis.    Subj: Muna presents today for follow-up.  She is a couple months out from above procedures.  Last time we saw her she was still having quite a bit of pain and swelling in her right, operative, knee.  Today, she presents with her young child and reports that overall she is doing much better.  She has an occasional \"twinge\" in her knee.  Otherwise she has not had significant effusion and is able to do all of her activities.  She has questions for what to expect in the future, for example, the likelihood of recurrence.  The pathology report at time of procedure noted hypertrophic synovium but did not definitively diagnose synovial chondromatosis or TSGCT.  She has also had dense numbness in the saphenous distribution since the time of surgery.  No motor deficits.    Of note, she has a family history of gout in her father, rheumatoid arthritis in her mother, lupus in her Grandmother.    Objective:  No acute distress  Nonlabored breathing on room air  Musculoskeletal: Focused exam of the right lower extremity demonstrates well-healed surgical incisions on the anterior and posterior knee.  Her range of motion is full extension to about 120 degrees of flexion.  She is able to ambulate without an assistive device and without a limp. She is neurovascularly " intact in her foot.  She notes dense numbness on the medial aspect of the lower leg, in the saphenous distribution.  She is grossly neurovascularly intact in the foot.    Imaging: No new    Assessment/plan:  Muna is a 23-year-old female who is approximately 10 weeks out from anterior and posterior synovectomy of the right knee.    She is doing well.  Her pain and swelling have resolved.  The pathology results were relatively inconclusive.  She does have significant rheumatologic family history.  We will refer her for further rheumatologic work-up.  She can continue activity as tolerated.  We counseled her that the likelihood of recurrence is dependent on the synovial disease recurring which can happen but risk may be lowered with appropriate rheumatologic therapy.  Above discussed and patient amenable to rheum referral. She lives near [place] and requests referral closer to home which is very reasonable. We will fax the consult request.    She will follow up with us on an as needed basis.    Patient seen and discussed with Dr. Diane.    Muna Hester, PGY-4        Alexis Diane MD

## 2023-11-02 NOTE — PROGRESS NOTES
NEW PATIENT RHEUMATOLOGY VISIT     Assessment & Plan     Problem List  Scoliosis    Synovitis of knee  Comment: Right knee warmth and swelling since January with MRI results suspicious for possible pigmented villonodular synovitis versus synovial chondromatosis versus chronic synovitis.  Due to these results she underwent  synovectomy on July 25, 2023 with Dr. Diane, pathology she results were negative for neoplasm and showed chronic inflammation (lymphocytes and numerous plasma cells).  Endorses history of intermittent knee and wrist pain since elementary school.  Has also had multiple issues with her spine felt to be secondary to scoliosis.  More recently only her right knee has been giving her issues.  Currently without other small joint involvement or prominent inflammatory back pain symptoms.  Differential diagnosis includes rheumatoid arthritis, spondylarthritis, Lyme arthritis, or other infectious arthritis.  Has first-degree relative with rheumatoid arthritis and second-degree relative with lupus.  In regards to lupus review of systems is positive for possible Raynaud's and history of provoked blood clot.    Today with continued right knee synovitis.    Plan:   -Lyme serologies, RF, CCP, HLA-B27, and DAJUAN with IFA  -X-rays of hands, wrists, SI joints, and knees  -We will work on getting imaging reports of her spine from [facility]  -We will double check that cultures were not sent on synovium  -Return to clinic in 2 weeks.  We will plan on arthrocentesis with possible steroid injection at that time    History of DVT and PE  Comment: Induced in setting of any arthroscopy.  Currently on Eliquis.  Factor V Leiden negative.  No previous blood clots or miscarriages.  Has a family history of lupus    Plan:  -Check antiphospholipid antibodies    Orders Placed This Encounter   Procedures    XR Knee Bilateral 3 Views    XR Sacroiliac Joint 1/2 Views    XR Hand Bilateral 2 Views    XR Wrist Bilateral G/E  3 Views    Rheumatoid factor    Cyclic Citrullinated Peptide Antibody IgG    Lyme Disease Total Abs Bld with Reflex to Confirm CLIA    Beta 2 Glycoprotein Antibodies IGG IGM    Cardiolipin Yuli IgG and IgM    Lupus Anticoagulant Panel    HLA-B27 Typing    Anti Nuclear Yuli IgG by IFA with Reflex    Treponema Abs w Reflex to RPR and Titer    Erythrocyte sedimentation rate auto    CRP inflammation          >60 minutes spent on the day of the encounter doing chart review, history and exam, counseling and documentation.     Subjective         HPI    Muna Cobos is a 23 yr old female with a PMHx of appendicitis with perforation and abscess formation and scoliosis who presents for evaluation of right knee synovitis.    She reports that she developed right knee pain and swelling in January and was seen by [facility].  Initially she was told it was patellofemoral pain syndrome, but when her pain and swelling continued to recur she had an MRI done of the right knee which was concerning for possible pigmented villonodular synovitis versus synovial chondromatosis versus chronic synovitis.  Due to these results she underwent  synovectomy on July 25, 2023 with Dr. Diane, pathology she results were negative for neoplasm and showed chronic inflammation (lymphocytes and numerous plasma cells).  She reports she never had any fluid drained from the knee or any steroid injections in that knee.  Her surgery was complicated by DVT and PE for which she is currently on Eliquis.  Hypercoagulability work-up with factor V Leiden was negative.  She denies any history of previous blood clots and has no history of miscarriages.  She has 1 healthy baby girl.    Although this is the first time she had subsignificant issues of her joints, she reports a history of knee pain starting back in elementary school, right more than left.  She cannot remember if she had swelling at the time as well.  Per chart review she had x-rays done of her  knees in 2014 which were normal.  She also reports some intermittent wrist pain starting around the same time.  She has a long history of pain over her neck and back which is felt to be due to scoliosis.  She has never had to wear back brace or have any surgical intervention on her spine but has undergone physical therapy multiple times for this problem.  She also reports she was told she had a hairline fracture in her spine.  She reports her back pain is sometimes worse with activity and sometimes bothers her after periods of rest.  It has woken her up at night in the past.  She also had severe bilateral hip pain with her pregnancy.  More recently, has not been a major concern for her.  Currently denies pain or swelling in any other joints.  She has no history of inflammatory eye disease or inflammatory bowel disease.  She had some sort of rash as a child that she believes was eczema or psoriasis, but currently without any skin issues or rashes.  No history of dactylitis.    Unclear if has Raynaud's.  Endorses two episodes of her middle finger turning blue and painful that self resolved.  She reports she sometimes has pain in her hands. No oral or nasal ulcers, no GERD, no skin tighting, no blood in urine or stool, no dyspnea, no cough, no chest pain, no fevers or weigh loss,  no dry eyes or dry mouth, no numbness or tingling, no muscle pain.     Reports her grandfather and mom were diagnosed with some sort of bone disease she does not know more details.  Reports her mom had rheumatoid arthritis, but unclear if she is on medication.  Dad has gout.  Paternal grandmother with lupus.      Lab and Imaging review:    I reviewed recent labs and imaging including:    Labs 10/2023  CBC wnl, crp wnl (down from 20), esr wnl (down from 26),     Pathology report 7/25/2023    Chronic inflammation is constituted by lymphocytes and numerous plasma cells. The presence of the latter raises the possibility of  an immune mediated  condition such as rheumatoid arthritis. Please correlate with clinical and serological information    Final Diagnosis  A. Right knee, posterior synovium, excision:  - Chronic hyperplastic synovitis  - Negative for acute inflammation and neoplasm  B. Right knee, anterior synovium, excision:  - Chronic hyperplastic synovitis  - Negative for acute inflammation and neoplasm    MRI right knee 7/2023  IMPRESSION:   1. Large joint effusion with diffuse chronic synovitis or nodular synovitis.   Synovial chondromatosis or PVNS is a possibility. Inflammatory arthritis is a   possibility. Similar less pronounced involvement of the proximal tibiofibular   joint.   2. No internal derangement.       Current Outpatient Medications:     acetaminophen (TYLENOL) 325 MG tablet, Take 2 tablets (650 mg) by mouth every 4 hours as needed for mild pain, Disp: 50 tablet, Rfl: 0    ELIQUIS ANTICOAGULANT 5 MG tablet, Take 5 mg by mouth 2 times daily, Disp: , Rfl:     amoxicillin-clavulanate (AUGMENTIN) 875-125 MG per tablet, Take 1 tablet by mouth 2 times daily (Patient not taking: Reported on 7/29/2023), Disp: 20 tablet, Rfl: 0    oxyCODONE (ROXICODONE) 5 MG tablet, Take 1-2 tablets (5-10 mg) by mouth every 4 hours as needed for moderate to severe pain (Patient not taking: Reported on 10/11/2023), Disp: 20 tablet, Rfl: 0  Allergies:  No Known Allergies  Medical Hx:  No past medical history on file.  Surgical Hx:  Past Surgical History:   Procedure Laterality Date    ARTHROSCOPY KNEE Right 7/25/2023    Procedure: Right anterior knee arthroscopic;  Surgeon: Pj Xiong MD;  [facility]: [facility]    LAPAROSCOPIC APPENDECTOMY N/A 5/8/2016    Procedure: LAPAROSCOPIC APPENDECTOMY;  Surgeon: Jesus Joiner MD;  [facility]: [facility]    SYNOVECTOMY KNEE Right 7/25/2023    Procedure: synovectomy;  Surgeon: Pj Xiong MD;  [facility]: [facility]    SYNOVECTOMY KNEE POSTERIOR Right 7/25/2023    Procedure: Right Knee Open Posterior  Synovectomy;  Surgeon: Alexis Diane MD;  Location: [facility] OR     Family Hx:  No family history on file.  Social Hx:  Social History     Tobacco Use    Smoking status: Never    Smokeless tobacco: Never   Vaping Use    Vaping Use: Former   Substance Use Topics    Drug use: Not Currently        Objective   Physical Exam   /69 (BP Location: Left arm, Patient Position: Sitting, Cuff Size: Adult Regular)   Wt 79.8 kg (176 lb)   SpO2 97%   BMI 29.29 kg/m    General: alert, well appearing, no distress  HEENT: no alopecia, clear scalp, clear conjunctiva, no oral or nasal ulcers, no cervical lymphadenopathy  Cardiac: normal rate and rhythm, no murmur, rubs or gallops   Pulm: normal respiratory effort, clear to auscultation bilaterally   MSK: Hand, wrist, elbow, and shoulder joints without evidence of synovitis or effusion. Intact and nonpainful range of motion  No hypermobility at the elbows, thumbs or pinkies.   Warmth and swelling of the right knee with TTP and someone reduced ROM. Left knee without warmth, swelling or TTP. Full ROM  Bilateral hips with full, nonpainful ROM.   Foot/ankle joints bilatearlly without erythema/effusion/tenderness to palpation and with intact nonpainful range of motion.   No pain over enthesial sites   Skin: No rashes, warm and well-perfused. No nail pitting, digital ulceration, dactylitis, or telangiectasias. No subcutaneous nodules.        Eloise Mcgarry MD  Rheumatology

## 2023-11-03 ENCOUNTER — HOSPITAL ENCOUNTER (OUTPATIENT)
Dept: GENERAL RADIOLOGY | Facility: CLINIC | Age: 23
Discharge: HOME | End: 2023-11-03
Attending: STUDENT IN AN ORGANIZED HEALTH CARE EDUCATION/TRAINING PROGRAM
Payer: COMMERCIAL

## 2023-11-03 ENCOUNTER — LAB (OUTPATIENT)
Dept: LAB | Facility: CLINIC | Age: 23
End: 2023-11-03
Attending: STUDENT IN AN ORGANIZED HEALTH CARE EDUCATION/TRAINING PROGRAM
Payer: COMMERCIAL

## 2023-11-03 ENCOUNTER — OFFICE VISIT (OUTPATIENT)
Dept: RHEUMATOLOGY | Facility: CLINIC | Age: 23
End: 2023-11-03
Attending: STUDENT IN AN ORGANIZED HEALTH CARE EDUCATION/TRAINING PROGRAM
Payer: COMMERCIAL

## 2023-11-03 VITALS
BODY MASS INDEX: 29.29 KG/M2 | SYSTOLIC BLOOD PRESSURE: 103 MMHG | DIASTOLIC BLOOD PRESSURE: 69 MMHG | OXYGEN SATURATION: 97 % | WEIGHT: 176 LBS

## 2023-11-03 DIAGNOSIS — M65.969 SYNOVITIS OF KNEE: ICD-10-CM

## 2023-11-03 LAB
CRP SERPL-MCNC: <3 MG/L
ERYTHROCYTE [SEDIMENTATION RATE] IN BLOOD BY WESTERGREN METHOD: 16 MM/HR (ref 0–20)
T PALLIDUM AB SER QL: NONREACTIVE

## 2023-11-03 PROCEDURE — G0463 HOSPITAL OUTPT CLINIC VISIT: HCPCS | Performed by: STUDENT IN AN ORGANIZED HEALTH CARE EDUCATION/TRAINING PROGRAM

## 2023-11-03 PROCEDURE — 86140 C-REACTIVE PROTEIN: CPT

## 2023-11-03 PROCEDURE — 86038 ANTINUCLEAR ANTIBODIES: CPT | Performed by: STUDENT IN AN ORGANIZED HEALTH CARE EDUCATION/TRAINING PROGRAM

## 2023-11-03 PROCEDURE — 99205 OFFICE O/P NEW HI 60 MIN: CPT | Performed by: STUDENT IN AN ORGANIZED HEALTH CARE EDUCATION/TRAINING PROGRAM

## 2023-11-03 PROCEDURE — 86147 CARDIOLIPIN ANTIBODY EA IG: CPT | Performed by: STUDENT IN AN ORGANIZED HEALTH CARE EDUCATION/TRAINING PROGRAM

## 2023-11-03 PROCEDURE — 73562 X-RAY EXAM OF KNEE 3: CPT | Mod: 50

## 2023-11-03 PROCEDURE — 86780 TREPONEMA PALLIDUM: CPT

## 2023-11-03 PROCEDURE — 73120 X-RAY EXAM OF HAND: CPT | Mod: 50

## 2023-11-03 PROCEDURE — 86431 RHEUMATOID FACTOR QUANT: CPT | Performed by: STUDENT IN AN ORGANIZED HEALTH CARE EDUCATION/TRAINING PROGRAM

## 2023-11-03 PROCEDURE — 86481 TB AG RESPONSE T-CELL SUSP: CPT

## 2023-11-03 PROCEDURE — 85652 RBC SED RATE AUTOMATED: CPT

## 2023-11-03 PROCEDURE — 36415 COLL VENOUS BLD VENIPUNCTURE: CPT | Performed by: STUDENT IN AN ORGANIZED HEALTH CARE EDUCATION/TRAINING PROGRAM

## 2023-11-03 PROCEDURE — 72200 X-RAY EXAM SI JOINTS: CPT

## 2023-11-03 PROCEDURE — 85390 FIBRINOLYSINS SCREEN I&R: CPT | Mod: 26 | Performed by: PATHOLOGY

## 2023-11-03 PROCEDURE — 73110 X-RAY EXAM OF WRIST: CPT | Mod: 50

## 2023-11-03 PROCEDURE — 85730 THROMBOPLASTIN TIME PARTIAL: CPT | Performed by: STUDENT IN AN ORGANIZED HEALTH CARE EDUCATION/TRAINING PROGRAM

## 2023-11-03 PROCEDURE — 86146 BETA-2 GLYCOPROTEIN ANTIBODY: CPT | Performed by: STUDENT IN AN ORGANIZED HEALTH CARE EDUCATION/TRAINING PROGRAM

## 2023-11-03 PROCEDURE — 86200 CCP ANTIBODY: CPT | Performed by: STUDENT IN AN ORGANIZED HEALTH CARE EDUCATION/TRAINING PROGRAM

## 2023-11-03 PROCEDURE — 86618 LYME DISEASE ANTIBODY: CPT | Performed by: STUDENT IN AN ORGANIZED HEALTH CARE EDUCATION/TRAINING PROGRAM

## 2023-11-03 PROCEDURE — 81374 HLA I TYPING 1 ANTIGEN LR: CPT | Performed by: STUDENT IN AN ORGANIZED HEALTH CARE EDUCATION/TRAINING PROGRAM

## 2023-11-03 ASSESSMENT — PAIN SCALES - GENERAL: PAINLEVEL: NO PAIN (0)

## 2023-11-03 NOTE — NURSING NOTE
Chief Complaint   Patient presents with    Consult     /69 (BP Location: Left arm, Patient Position: Sitting, Cuff Size: Adult Regular)   Wt 79.8 kg (176 lb)   SpO2 97%   BMI 29.29 kg/m

## 2023-11-03 NOTE — LETTER
11/3/2023       RE: Muna Cobos  [address]  [place] [address]     Dear Colleague,    Thank you for referring your patient, Muna Cobos, to the [facility] at [facility]. Please see a copy of my visit note below.    NEW PATIENT RHEUMATOLOGY VISIT     Assessment & Plan    Problem List  Scoliosis    Synovitis of knee  Comment: Right knee warmth and swelling since January with MRI results suspicious for possible pigmented villonodular synovitis versus synovial chondromatosis versus chronic synovitis.  Due to these results she underwent  synovectomy on July 25, 2023 with Dr. Diane, pathology she results were negative for neoplasm and showed chronic inflammation (lymphocytes and numerous plasma cells).  Endorses history of intermittent knee and wrist pain since elementary school.  Has also had multiple issues with her spine felt to be secondary to scoliosis.  More recently only her right knee has been giving her issues.  Currently without other small joint involvement or prominent inflammatory back pain symptoms.  Differential diagnosis includes rheumatoid arthritis, spondylarthritis, Lyme arthritis, or other infectious arthritis.  Has first-degree relative with rheumatoid arthritis and second-degree relative with lupus.  In regards to lupus review of systems is positive for possible Raynaud's and history of provoked blood clot.    Today with continued right knee synovitis.    Plan:   -Lyme serologies, RF, CCP, HLA-B27, and DAJUAN with IFA  -X-rays of hands, wrists, SI joints, and knees  -We will work on getting imaging reports of her spine from [facility]  -We will double check that cultures were not sent on synovium  -Return to clinic in 2 weeks.  We will plan on arthrocentesis with possible steroid injection at that time    History of DVT and PE  Comment: Induced in setting of any arthroscopy.  Currently on Eliquis.  Factor V  Leiden negative.  No previous blood clots or miscarriages.  Has a family history of lupus    Plan:  -Check antiphospholipid antibodies    Orders Placed This Encounter   Procedures    XR Knee Bilateral 3 Views    XR Sacroiliac Joint 1/2 Views    XR Hand Bilateral 2 Views    XR Wrist Bilateral G/E 3 Views    Rheumatoid factor    Cyclic Citrullinated Peptide Antibody IgG    Lyme Disease Total Abs Bld with Reflex to Confirm CLIA    Beta 2 Glycoprotein Antibodies IGG IGM    Cardiolipin Yuli IgG and IgM    Lupus Anticoagulant Panel    HLA-B27 Typing    Anti Nuclear Yuli IgG by IFA with Reflex    Treponema Abs w Reflex to RPR and Titer    Erythrocyte sedimentation rate auto    CRP inflammation          >60 minutes spent on the day of the encounter doing chart review, history and exam, counseling and documentation.     Subjective        HPI    Muna Cobos is a 23 yr old female with a PMHx of appendicitis with perforation and abscess formation and scoliosis who presents for evaluation of right knee synovitis.    She reports that she developed right knee pain and swelling in January and was seen by [facility].  Initially she was told it was patellofemoral pain syndrome, but when her pain and swelling continued to recur she had an MRI done of the right knee which was concerning for possible pigmented villonodular synovitis versus synovial chondromatosis versus chronic synovitis.  Due to these results she underwent  synovectomy on July 25, 2023 with Dr. Diane, pathology she results were negative for neoplasm and showed chronic inflammation (lymphocytes and numerous plasma cells).  She reports she never had any fluid drained from the knee or any steroid injections in that knee.  Her surgery was complicated by DVT and PE for which she is currently on Eliquis.  Hypercoagulability work-up with factor V Leiden was negative.  She denies any history of previous blood clots and has no history of miscarriages.  She has 1  healthy baby girl.    Although this is the first time she had subsignificant issues of her joints, she reports a history of knee pain starting back in elementary school, right more than left.  She cannot remember if she had swelling at the time as well.  Per chart review she had x-rays done of her knees in 2014 which were normal.  She also reports some intermittent wrist pain starting around the same time.  She has a long history of pain over her neck and back which is felt to be due to scoliosis.  She has never had to wear back brace or have any surgical intervention on her spine but has undergone physical therapy multiple times for this problem.  She also reports she was told she had a hairline fracture in her spine.  She reports her back pain is sometimes worse with activity and sometimes bothers her after periods of rest.  It has woken her up at night in the past.  She also had severe bilateral hip pain with her pregnancy.  More recently, has not been a major concern for her.  Currently denies pain or swelling in any other joints.  She has no history of inflammatory eye disease or inflammatory bowel disease.  She had some sort of rash as a child that she believes was eczema or psoriasis, but currently without any skin issues or rashes.  No history of dactylitis.    Unclear if has Raynaud's.  Endorses two episodes of her middle finger turning blue and painful that self resolved.  She reports she sometimes has pain in her hands. No oral or nasal ulcers, no GERD, no skin tighting, no blood in urine or stool, no dyspnea, no cough, no chest pain, no fevers or weigh loss,  no dry eyes or dry mouth, no numbness or tingling, no muscle pain.     Reports her grandfather and mom were diagnosed with some sort of bone disease she does not know more details.  Reports her mom had rheumatoid arthritis, but unclear if she is on medication.  Dad has gout.  Paternal grandmother with lupus.      Lab and Imaging review:    I  reviewed recent labs and imaging including:    Labs 10/2023  CBC wnl, crp wnl (down from 20), esr wnl (down from 26),     Pathology report 7/25/2023    Chronic inflammation is constituted by lymphocytes and numerous plasma cells. The presence of the latter raises the possibility of  an immune mediated condition such as rheumatoid arthritis. Please correlate with clinical and serological information    Final Diagnosis  A. Right knee, posterior synovium, excision:  - Chronic hyperplastic synovitis  - Negative for acute inflammation and neoplasm  B. Right knee, anterior synovium, excision:  - Chronic hyperplastic synovitis  - Negative for acute inflammation and neoplasm    MRI right knee 7/2023  IMPRESSION:   1. Large joint effusion with diffuse chronic synovitis or nodular synovitis.   Synovial chondromatosis or PVNS is a possibility. Inflammatory arthritis is a   possibility. Similar less pronounced involvement of the proximal tibiofibular   joint.   2. No internal derangement.       Current Outpatient Medications:     acetaminophen (TYLENOL) 325 MG tablet, Take 2 tablets (650 mg) by mouth every 4 hours as needed for mild pain, Disp: 50 tablet, Rfl: 0    ELIQUIS ANTICOAGULANT 5 MG tablet, Take 5 mg by mouth 2 times daily, Disp: , Rfl:     amoxicillin-clavulanate (AUGMENTIN) 875-125 MG per tablet, Take 1 tablet by mouth 2 times daily (Patient not taking: Reported on 7/29/2023), Disp: 20 tablet, Rfl: 0    oxyCODONE (ROXICODONE) 5 MG tablet, Take 1-2 tablets (5-10 mg) by mouth every 4 hours as needed for moderate to severe pain (Patient not taking: Reported on 10/11/2023), Disp: 20 tablet, Rfl: 0  Allergies:  No Known Allergies  Medical Hx:  No past medical history on file.  Surgical Hx:  Past Surgical History:   Procedure Laterality Date    ARTHROSCOPY KNEE Right 7/25/2023    Procedure: Right anterior knee arthroscopic;  Surgeon: Pj Xiong MD;  [facility]    LAPAROSCOPIC APPENDECTOMY N/A  5/8/2016    Procedure: LAPAROSCOPIC APPENDECTOMY;  Surgeon: Jesus Joiner MD;  [facility]    SYNOVECTOMY KNEE Right 7/25/2023    Procedure: synovectomy;  Surgeon: Pj Xiong MD;  [facility]    SYNOVECTOMY KNEE POSTERIOR Right 7/25/2023    Procedure: Right Knee Open Posterior Synovectomy;  Surgeon: Alexis Diane MD;  [facility]     Family Hx:  No family history on file.  Social Hx:  Social History     Tobacco Use    Smoking status: Never    Smokeless tobacco: Never   Vaping Use    Vaping Use: Former   Substance Use Topics    Drug use: Not Currently        Objective  Physical Exam   /69 (BP Location: Left arm, Patient Position: Sitting, Cuff Size: Adult Regular)   Wt 79.8 kg (176 lb)   SpO2 97%   BMI 29.29 kg/m    General: alert, well appearing, no distress  HEENT: no alopecia, clear scalp, clear conjunctiva, no oral or nasal ulcers, no cervical lymphadenopathy  Cardiac: normal rate and rhythm, no murmur, rubs or gallops   Pulm: normal respiratory effort, clear to auscultation bilaterally   MSK: Hand, wrist, elbow, and shoulder joints without evidence of synovitis or effusion. Intact and nonpainful range of motion  No hypermobility at the elbows, thumbs or pinkies.   Warmth and swelling of the right knee with TTP and someone reduced ROM. Left knee without warmth, swelling or TTP. Full ROM  Bilateral hips with full, nonpainful ROM.   Foot/ankle joints bilatearlly without erythema/effusion/tenderness to palpation and with intact nonpainful range of motion.   No pain over enthesial sites   Skin: No rashes, warm and well-perfused. No nail pitting, digital ulceration, dactylitis, or telangiectasias. No subcutaneous nodules.        Eloise Mcgarry MD  Rheumatology

## 2023-11-04 LAB
GAMMA INTERFERON BACKGROUND BLD IA-ACNC: 0 IU/ML
M TB IFN-G BLD-IMP: NEGATIVE
M TB IFN-G CD4+ BCKGRND COR BLD-ACNC: 10 IU/ML
MITOGEN IGNF BCKGRD COR BLD-ACNC: 0.02 IU/ML
MITOGEN IGNF BCKGRD COR BLD-ACNC: 0.02 IU/ML
QUANTIFERON MITOGEN: 10 IU/ML
QUANTIFERON NIL TUBE: 0 IU/ML
QUANTIFERON TB1 TUBE: 0.02 IU/ML
QUANTIFERON TB2 TUBE: 0.02

## 2023-11-06 LAB
ANA PAT SER IF-IMP: ABNORMAL
ANA SER QL IF: POSITIVE
ANA TITR SER IF: ABNORMAL {TITER}
B BURGDOR IGG+IGM SER QL: 0.04
B2 GLYCOPROT1 IGG SERPL IA-ACNC: 1.4 U/ML
B2 GLYCOPROT1 IGM SERPL IA-ACNC: <2.4 U/ML
CARDIOLIPIN IGG SER IA-ACNC: <2 GPL-U/ML
CARDIOLIPIN IGG SER IA-ACNC: NEGATIVE
CARDIOLIPIN IGM SER IA-ACNC: 2.5 MPL-U/ML
CARDIOLIPIN IGM SER IA-ACNC: NEGATIVE
CCP AB SER IA-ACNC: 1.8 U/ML
DRVVT SCREEN RATIO: 1.04
INR PPP: 1.24 (ref 0.85–1.15)
LA PPP-IMP: NEGATIVE
LUPUS INTERPRETATION: ABNORMAL
PTT RATIO: 1.08
RHEUMATOID FACT SER NEPH-ACNC: 7 IU/ML
THROMBIN TIME: 16 SECONDS (ref 13–19)

## 2023-11-09 LAB
B LOCUS: NORMAL
B27TEST METHOD: NORMAL

## 2023-11-14 NOTE — PROGRESS NOTES
" RHEUMATOLOGY FOLLOW UP VISIT     Assessment & Plan     Problem List  Scoliosis  Chronic right knee synovitis  +DAJUAN    Synovitis of knee  Comment: Right knee warmth and swelling since January with MRI results suspicious for possible pigmented villonodular synovitis versus synovial chondromatosis versus chronic synovitis.  Due to these results she underwent  synovectomy on July 25, 2023 with Dr. Diane, pathology results were negative for neoplasm and showed chronic inflammation (lymphocytes and numerous plasma cells).      Work up has been negative for RF, CCP, HLA-B27, Esr and crp wnl, Negative quant gold, treponema abs and lyme serologies.  X-rays of the hands, wrists, SI joints, and knees from 11/2023 do not show signs of erosion joint space narrowing, bone production, or sacroiliitis.  Endorses history of intermittent knee and wrist pain since elementary school.  Has also had multiple issues with her spine felt to be secondary to scoliosis.  More recently only her right knee has been giving her issues.  Currently without other small joint involvement or prominent inflammatory back pain symptoms, but does endorse morning stiffness for over an hour over all her joints. \"I feel like the tin man\".     She was found to have a +DAJUAN 1:160 speckled.  However, without any signs or symptoms concerning for systemic autoimmune disease besides some possible Raynaud's.  Given her history of a DVT (provoked) antiphospholipid antibodies were checked and were negative.    Unclear etiology of her chronic right knee synovitis, but could possibly be consistent with an underlying spondylarthritis.  However, currently not enough clinical evidence for this diagnosis.  No history of psoriasis, inflammatory bowel disease, uveitis, or inflammatory eye disease.    Today with continued right knee synovitis, somewhat improved from her last visit.  I communicated with Dr. Diane, and he is okay for steroid injection at this time.  "     Plan:   -Arthrocentesis was performed today in clinic with fluid sent for Gram stain and culture, cell count, and crystal analysis.  Knee was injected with 60 mg of Depo-Medrol.  -We will work on getting imaging reports of her spine from [facility]  -ENAs       History of DVT and PE  Comment: Induced in setting of any arthroscopy.  Currently on Eliquis.  Factor V Leiden negative.  No previous blood clots or miscarriages.  Has a family history of lupus. APS antibodies negative       Orders Placed This Encounter   Procedures    HC ARTHROCENTESIS ASPIR&/INJ INTERM JT/BURS W/US    Crystal ID Synovial Fluid    RONALD antibody panel    UA with Microscopic reflex to Culture    Cell Count Body Fluid    Differential Body Fluid      Return to clinic in 2 weeks.      >40 minutes spent on the day of the encounter doing chart review, history and exam, counseling and documentation, not including time spent doing procedure.    Subjective      Reports continued right knee swelling and pain but somewhat improved from the last saw her.  Today she also endorses stiffness x 1 hour in the morning over her hands, back, and knees.  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  She also feels that she gets sick more easily than her  and when she does get sick that lasts longer.  Today she denies any fevers, fatigue, or other constitutional symptoms.        RENETTA Cobos is a 23 yr old female with a PMHx of appendicitis with perforation and abscess formation and scoliosis who presents for evaluation of right knee synovitis.    She reports that she developed right knee pain and swelling in January and was seen by [place] [facility].  Initially she was told it was patellofemoral pain syndrome, but when her pain and swelling continued to recur she had an MRI done of the right knee which was concerning for possible pigmented villonodular synovitis versus synovial chondromatosis versus chronic synovitis.  Due to these  results she underwent  synovectomy on July 25, 2023 with Dr. Diane, pathology she results were negative for neoplasm and showed chronic inflammation (lymphocytes and numerous plasma cells).  She reports she never had any fluid drained from the knee or any steroid injections in that knee.  Her surgery was complicated by DVT and PE for which she is currently on Eliquis.  Hypercoagulability work-up with factor V Leiden was negative.  She denies any history of previous blood clots and has no history of miscarriages.  She has 1 healthy baby girl.    Although this is the first time she had subsignificant issues of her joints, she reports a history of knee pain starting back in elementary school, right more than left.  She cannot remember if she had swelling at the time as well.  Per chart review she had x-rays done of her knees in 2014 which were normal.  She also reports some intermittent wrist pain starting around the same time.  She has a long history of pain over her neck and back which is felt to be due to scoliosis.  She has never had to wear back brace or have any surgical intervention on her spine but has undergone physical therapy multiple times for this problem.  She also reports she was told she had a hairline fracture in her spine.  She reports her back pain is sometimes worse with activity and sometimes bothers her after periods of rest.  It has woken her up at night in the past.  She also had severe bilateral hip pain with her pregnancy.  More recently, has not been a major concern for her.  Currently denies pain or swelling in any other joints.  She has no history of inflammatory eye disease or inflammatory bowel disease.  She had some sort of rash as a child that she believes was eczema or psoriasis, but currently without any skin issues or rashes.  No history of dactylitis.    Unclear if has Raynaud's.  Endorses two episodes of her middle finger turning blue and painful that self resolved.  She reports  she sometimes has pain in her hands. No oral or nasal ulcers, no GERD, no skin tighting, no blood in urine or stool, no dyspnea, no cough, no chest pain, no fevers or weigh loss,  no dry eyes or dry mouth, no numbness or tingling, no muscle pain.     Reports her grandfather and mom were diagnosed with some sort of bone disease she does not know more details.  Reports her mom had rheumatoid arthritis, but unclear if she is on medication.  Dad has gout.  Paternal grandmother with lupus.      Lab and Imaging review:    I reviewed recent labs and imaging including:    Labs 11/03/23  Esr and crp wnl,   Negative quant gold, treponema abs, lyme  Negative HLAB27, APS antibodies, RF, CCP,  +DAJUAN 1:160 speckled    Labs 10/2023  CBC wnl, crp wnl (down from 20), esr wnl (down from 26),     Pathology report 7/25/2023    Chronic inflammation is constituted by lymphocytes and numerous plasma cells. The presence of the latter raises the possibility of  an immune mediated condition such as rheumatoid arthritis. Please correlate with clinical and serological information    Final Diagnosis  A. Right knee, posterior synovium, excision:  - Chronic hyperplastic synovitis  - Negative for acute inflammation and neoplasm  B. Right knee, anterior synovium, excision:  - Chronic hyperplastic synovitis  - Negative for acute inflammation and neoplasm    MRI right knee 7/2023  IMPRESSION:   1. Large joint effusion with diffuse chronic synovitis or nodular synovitis.   Synovial chondromatosis or PVNS is a possibility. Inflammatory arthritis is a   possibility. Similar less pronounced involvement of the proximal tibiofibular   joint.   2. No internal derangement.       Current Outpatient Medications:     acetaminophen (TYLENOL) 325 MG tablet, Take 2 tablets (650 mg) by mouth every 4 hours as needed for mild pain, Disp: 50 tablet, Rfl: 0    ELIQUIS ANTICOAGULANT 5 MG tablet, Take 5 mg by mouth 2 times daily, Disp: , Rfl:     amoxicillin-clavulanate  (AUGMENTIN) 875-125 MG per tablet, Take 1 tablet by mouth 2 times daily (Patient not taking: Reported on 7/29/2023), Disp: 20 tablet, Rfl: 0    oxyCODONE (ROXICODONE) 5 MG tablet, Take 1-2 tablets (5-10 mg) by mouth every 4 hours as needed for moderate to severe pain (Patient not taking: Reported on 10/11/2023), Disp: 20 tablet, Rfl: 0    Current Facility-Administered Medications:     lidocaine (PF) (XYLOCAINE) 1 % injection 1 mL, 1 mL, INTRA-ARTICULAR, Once, Eloise Mcgarry MD    methylPREDNISolone (DEPO-Medrol) injection 60 mg, 60 mg, INTRA-ARTICULAR, Once, Eloise Mcgarry MD  Allergies:  No Known Allergies  Medical Hx:  No past medical history on file.  Surgical Hx:  Past Surgical History:   Procedure Laterality Date    ARTHROSCOPY KNEE Right 7/25/2023    Procedure: Right anterior knee arthroscopic;  Surgeon: Pj Xiong MD;  [facility]    LAPAROSCOPIC APPENDECTOMY N/A 5/8/2016    Procedure: LAPAROSCOPIC APPENDECTOMY;  Surgeon: Jesus Joiner MD;  [facility]    SYNOVECTOMY KNEE Right 7/25/2023    Procedure: synovectomy;  Surgeon: Pj Xiong MD;  [facility]    SYNOVECTOMY KNEE POSTERIOR Right 7/25/2023    Procedure: Right Knee Open Posterior Synovectomy;  Surgeon: Alexis Diane MD;  [facility]     Family Hx:  No family history on file.  Social Hx:  Social History     Tobacco Use    Smoking status: Never    Smokeless tobacco: Never   Vaping Use    Vaping Use: Former   Substance Use Topics    Drug use: Not Currently        Objective   Physical Exam   /67 (BP Location: Right arm, Patient Position: Sitting, Cuff Size: Adult Large)   Pulse 87   Temp 98.3  F (36.8  C) (Oral)   Wt 78.9 kg (174 lb)   SpO2 98%   BMI 28.96 kg/m    General: alert, well appearing, no distress  HEENT:  clear conjunctiva,   Cardiac: Warm and well-perfused  Pulm: Breathing without difficulty on room air  MSK: Hand, wrist, elbow,  and shoulder joints without evidence of synovitis or effusion. Intact and nonpainful range of motion  From previous exam: No hypermobility at the elbows, thumbs or pinkies.   Warmth and swelling of the right knee without TTP and someone reduced ROM, improved from prior. Left knee without warmth, swelling or TTP. Full ROM  From previous exam: Bilateral hips with full, nonpainful ROM.   Foot/ankle joints bilatearlly without erythema/effusion/tenderness to palpation and with intact nonpainful range of motion.   No pain over enthesial sites   Skin: No rashes, warm and well-perfused. No nail pitting, digital ulceration, dactylitis, or telangiectasias. No subcutaneous nodules.     Procedure     Right knee arthrocentesis  Indication: Synovitis  Risks of the procedure were reviewed with the patient which include but are not limited to risk of infection, pain, and bleeding at site of injection. Informed consent was obtained. Time out was performed.  The site was marked and the right knee joint was prepped in sterile fashion with chlorhexidine solution. Using a/n medial approach, the right knee joint space was accessed with a 21 G needle and aspiration was performed, which yielded 10 cc of serosanguineous synovial fluid. The knee was then injected with a solution of 60mg of Depo-Medrol mixed with 2cc of lidocaine, using a 3cc syringe.  Amount of fluid removed: 10 cc  Estimated Blood Loss: minimal  The patient tolerated the procedure well without any adverse events. They were instructed to rest, and intermittently ice the joint over the next 24 hours. They were instructed to call our office and come to the ER in case of a fever, chills, excessive pain, or bleeding at the site of injection.         Eloise Mcgarry MD  Rheumatology

## 2023-11-17 ENCOUNTER — LAB (OUTPATIENT)
Dept: LAB | Facility: CLINIC | Age: 23
End: 2023-11-17
Attending: STUDENT IN AN ORGANIZED HEALTH CARE EDUCATION/TRAINING PROGRAM
Payer: COMMERCIAL

## 2023-11-17 ENCOUNTER — OFFICE VISIT (OUTPATIENT)
Dept: RHEUMATOLOGY | Facility: CLINIC | Age: 23
End: 2023-11-17
Attending: STUDENT IN AN ORGANIZED HEALTH CARE EDUCATION/TRAINING PROGRAM
Payer: COMMERCIAL

## 2023-11-17 VITALS
SYSTOLIC BLOOD PRESSURE: 104 MMHG | TEMPERATURE: 98.3 F | DIASTOLIC BLOOD PRESSURE: 67 MMHG | WEIGHT: 174 LBS | HEART RATE: 87 BPM | BODY MASS INDEX: 28.96 KG/M2 | OXYGEN SATURATION: 98 %

## 2023-11-17 DIAGNOSIS — M65.969 SYNOVITIS OF KNEE: Primary | ICD-10-CM

## 2023-11-17 DIAGNOSIS — M65.969 SYNOVITIS OF KNEE: ICD-10-CM

## 2023-11-17 LAB
ALBUMIN UR-MCNC: NEGATIVE MG/DL
APPEARANCE FLD: ABNORMAL
APPEARANCE UR: CLEAR
BILIRUB UR QL STRIP: NEGATIVE
CELL COUNT BODY FLUID SOURCE: ABNORMAL
COLOR FLD: ABNORMAL
COLOR UR AUTO: ABNORMAL
CRYSTALS SNV MICRO: NORMAL
GLUCOSE UR STRIP-MCNC: NEGATIVE MG/DL
HGB UR QL STRIP: ABNORMAL
KETONES UR STRIP-MCNC: NEGATIVE MG/DL
LEUKOCYTE ESTERASE UR QL STRIP: NEGATIVE
LYMPHOCYTES NFR FLD MANUAL: 62 %
MONOS+MACROS NFR FLD MANUAL: 12 %
MUCOUS THREADS #/AREA URNS LPF: PRESENT /LPF
NEUTS BAND NFR FLD MANUAL: 26 %
NITRATE UR QL: NEGATIVE
PH UR STRIP: 7.5 [PH] (ref 5–7)
RBC # FLD: ABNORMAL /UL
RBC URINE: 1 /HPF
SP GR UR STRIP: 1.02 (ref 1–1.03)
SQUAMOUS EPITHELIAL: <1 /HPF
UROBILINOGEN UR STRIP-MCNC: NORMAL MG/DL
WBC # FLD AUTO: 5803 /UL
WBC URINE: <1 /HPF

## 2023-11-17 PROCEDURE — 36415 COLL VENOUS BLD VENIPUNCTURE: CPT

## 2023-11-17 PROCEDURE — 81001 URINALYSIS AUTO W/SCOPE: CPT

## 2023-11-17 PROCEDURE — G0463 HOSPITAL OUTPT CLINIC VISIT: HCPCS | Performed by: STUDENT IN AN ORGANIZED HEALTH CARE EDUCATION/TRAINING PROGRAM

## 2023-11-17 PROCEDURE — 99215 OFFICE O/P EST HI 40 MIN: CPT | Performed by: STUDENT IN AN ORGANIZED HEALTH CARE EDUCATION/TRAINING PROGRAM

## 2023-11-17 PROCEDURE — 20606 DRAIN/INJ JOINT/BURSA W/US: CPT | Performed by: STUDENT IN AN ORGANIZED HEALTH CARE EDUCATION/TRAINING PROGRAM

## 2023-11-17 PROCEDURE — 87205 SMEAR GRAM STAIN: CPT | Performed by: STUDENT IN AN ORGANIZED HEALTH CARE EDUCATION/TRAINING PROGRAM

## 2023-11-17 PROCEDURE — 89051 BODY FLUID CELL COUNT: CPT | Performed by: STUDENT IN AN ORGANIZED HEALTH CARE EDUCATION/TRAINING PROGRAM

## 2023-11-17 PROCEDURE — 86235 NUCLEAR ANTIGEN ANTIBODY: CPT

## 2023-11-17 PROCEDURE — 89060 EXAM SYNOVIAL FLUID CRYSTALS: CPT | Performed by: STUDENT IN AN ORGANIZED HEALTH CARE EDUCATION/TRAINING PROGRAM

## 2023-11-17 RX ORDER — METHYLPREDNISOLONE ACETATE 80 MG/ML
60 INJECTION, SUSPENSION INTRA-ARTICULAR; INTRALESIONAL; INTRAMUSCULAR; SOFT TISSUE ONCE
Status: ACTIVE | OUTPATIENT
Start: 2023-11-17

## 2023-11-17 RX ORDER — LIDOCAINE HYDROCHLORIDE 10 MG/ML
1 INJECTION, SOLUTION EPIDURAL; INFILTRATION; INTRACAUDAL; PERINEURAL ONCE
Status: ACTIVE | OUTPATIENT
Start: 2023-11-17

## 2023-11-17 ASSESSMENT — PAIN SCALES - GENERAL: PAINLEVEL: NO PAIN (0)

## 2023-11-17 NOTE — LETTER
"11/17/2023       RE: Muna Cobos  [address]  [facility] [address]     Dear Colleague,    Thank you for referring your patient, Muna Cobos, to the [facility] RHEUMATOLOGY at [facility]. Please see a copy of my visit note below.     RHEUMATOLOGY FOLLOW UP VISIT     Assessment & Plan    Problem List  Scoliosis  Chronic right knee synovitis  +DAJUAN    Synovitis of knee  Comment: Right knee warmth and swelling since January with MRI results suspicious for possible pigmented villonodular synovitis versus synovial chondromatosis versus chronic synovitis.  Due to these results she underwent  synovectomy on July 25, 2023 with Dr. Diane, pathology results were negative for neoplasm and showed chronic inflammation (lymphocytes and numerous plasma cells).      Work up has been negative for RF, CCP, HLA-B27, Esr and crp wnl, Negative quant gold, treponema abs and lyme serologies.  X-rays of the hands, wrists, SI joints, and knees from 11/2023 do not show signs of erosion joint space narrowing, bone production, or sacroiliitis.  Endorses history of intermittent knee and wrist pain since elementary school.  Has also had multiple issues with her spine felt to be secondary to scoliosis.  More recently only her right knee has been giving her issues.  Currently without other small joint involvement or prominent inflammatory back pain symptoms, but does endorse morning stiffness for over an hour over all her joints. \"I feel like the tin man\".     She was found to have a +DAJUAN 1:160 speckled.  However, without any signs or symptoms concerning for systemic autoimmune disease besides some possible Raynaud's.  Given her history of a DVT (provoked) antiphospholipid antibodies were checked and were negative.    Unclear etiology of her chronic right knee synovitis, but could possibly be consistent with an underlying spondylarthritis.  However, currently not enough clinical " evidence for this diagnosis.  No history of psoriasis, inflammatory bowel disease, uveitis, or inflammatory eye disease.    Today with continued right knee synovitis, somewhat improved from her last visit.  I communicated with Dr. Diane, and he is okay for steroid injection at this time.      Plan:   -Arthrocentesis was performed today in clinic with fluid sent for Gram stain and culture, cell count, and crystal analysis.  Knee was injected with 60 mg of Depo-Medrol.  -We will work on getting imaging reports of her spine from [facility]  -ENAs       History of DVT and PE  Comment: Induced in setting of any arthroscopy.  Currently on Eliquis.  Factor V Leiden negative.  No previous blood clots or miscarriages.  Has a family history of lupus. APS antibodies negative       Orders Placed This Encounter   Procedures    HC ARTHROCENTESIS ASPIR&/INJ INTERM JT/BURS W/US    Crystal ID Synovial Fluid    RONALD antibody panel    UA with Microscopic reflex to Culture    Cell Count Body Fluid    Differential Body Fluid      Return to clinic in 2 weeks.      >40 minutes spent on the day of the encounter doing chart review, history and exam, counseling and documentation, not including time spent doing procedure.    Subjective     Reports continued right knee swelling and pain but somewhat improved from the last saw her.  Today she also endorses stiffness x 1 hour in the morning over her hands, back, and knees.  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  She also feels that she gets sick more easily than her  and when she does get sick that lasts longer.  Today she denies any fevers, fatigue, or other constitutional symptoms.        RENETTA Cobos is a 23 yr old female with a PMHx of appendicitis with perforation and abscess formation and scoliosis who presents for evaluation of right knee synovitis.    She reports that she developed right knee pain and swelling in January and was seen by [place]  [facility].  Initially she was told it was patellofemoral pain syndrome, but when her pain and swelling continued to recur she had an MRI done of the right knee which was concerning for possible pigmented villonodular synovitis versus synovial chondromatosis versus chronic synovitis.  Due to these results she underwent  synovectomy on July 25, 2023 with Dr. Diane, pathology she results were negative for neoplasm and showed chronic inflammation (lymphocytes and numerous plasma cells).  She reports she never had any fluid drained from the knee or any steroid injections in that knee.  Her surgery was complicated by DVT and PE for which she is currently on Eliquis.  Hypercoagulability work-up with factor V Leiden was negative.  She denies any history of previous blood clots and has no history of miscarriages.  She has 1 healthy baby girl.    Although this is the first time she had subsignificant issues of her joints, she reports a history of knee pain starting back in elementary school, right more than left.  She cannot remember if she had swelling at the time as well.  Per chart review she had x-rays done of her knees in 2014 which were normal.  She also reports some intermittent wrist pain starting around the same time.  She has a long history of pain over her neck and back which is felt to be due to scoliosis.  She has never had to wear back brace or have any surgical intervention on her spine but has undergone physical therapy multiple times for this problem.  She also reports she was told she had a hairline fracture in her spine.  She reports her back pain is sometimes worse with activity and sometimes bothers her after periods of rest.  It has woken her up at night in the past.  She also had severe bilateral hip pain with her pregnancy.  More recently, has not been a major concern for her.  Currently denies pain or swelling in any other joints.  She has no history of inflammatory eye disease or inflammatory  bowel disease.  She had some sort of rash as a child that she believes was eczema or psoriasis, but currently without any skin issues or rashes.  No history of dactylitis.    Unclear if has Raynaud's.  Endorses two episodes of her middle finger turning blue and painful that self resolved.  She reports she sometimes has pain in her hands. No oral or nasal ulcers, no GERD, no skin tighting, no blood in urine or stool, no dyspnea, no cough, no chest pain, no fevers or weigh loss,  no dry eyes or dry mouth, no numbness or tingling, no muscle pain.     Reports her grandfather and mom were diagnosed with some sort of bone disease she does not know more details.  Reports her mom had rheumatoid arthritis, but unclear if she is on medication.  Dad has gout.  Paternal grandmother with lupus.      Lab and Imaging review:    I reviewed recent labs and imaging including:    Labs 11/03/23  Esr and crp wnl,   Negative quant gold, treponema abs, lyme  Negative HLAB27, APS antibodies, RF, CCP,  +DAJUAN 1:160 speckled    Labs 10/2023  CBC wnl, crp wnl (down from 20), esr wnl (down from 26),     Pathology report 7/25/2023    Chronic inflammation is constituted by lymphocytes and numerous plasma cells. The presence of the latter raises the possibility of  an immune mediated condition such as rheumatoid arthritis. Please correlate with clinical and serological information    Final Diagnosis  A. Right knee, posterior synovium, excision:  - Chronic hyperplastic synovitis  - Negative for acute inflammation and neoplasm  B. Right knee, anterior synovium, excision:  - Chronic hyperplastic synovitis  - Negative for acute inflammation and neoplasm    MRI right knee 7/2023  IMPRESSION:   1. Large joint effusion with diffuse chronic synovitis or nodular synovitis.   Synovial chondromatosis or PVNS is a possibility. Inflammatory arthritis is a   possibility. Similar less pronounced involvement of the proximal tibiofibular   joint.   2. No internal  derangement.       Current Outpatient Medications:     acetaminophen (TYLENOL) 325 MG tablet, Take 2 tablets (650 mg) by mouth every 4 hours as needed for mild pain, Disp: 50 tablet, Rfl: 0    ELIQUIS ANTICOAGULANT 5 MG tablet, Take 5 mg by mouth 2 times daily, Disp: , Rfl:     amoxicillin-clavulanate (AUGMENTIN) 875-125 MG per tablet, Take 1 tablet by mouth 2 times daily (Patient not taking: Reported on 7/29/2023), Disp: 20 tablet, Rfl: 0    oxyCODONE (ROXICODONE) 5 MG tablet, Take 1-2 tablets (5-10 mg) by mouth every 4 hours as needed for moderate to severe pain (Patient not taking: Reported on 10/11/2023), Disp: 20 tablet, Rfl: 0    Current Facility-Administered Medications:     lidocaine (PF) (XYLOCAINE) 1 % injection 1 mL, 1 mL, INTRA-ARTICULAR, Once, Eloise Mcgarry MD    methylPREDNISolone (DEPO-Medrol) injection 60 mg, 60 mg, INTRA-ARTICULAR, Once, Eloise Mcgarry MD  Allergies:  No Known Allergies  Medical Hx:  No past medical history on file.  Surgical Hx:  Past Surgical History:   Procedure Laterality Date    ARTHROSCOPY KNEE Right 7/25/2023    Procedure: Right anterior knee arthroscopic;  Surgeon: Pj Xiong MD;  [facility]    LAPAROSCOPIC APPENDECTOMY N/A 5/8/2016    Procedure: LAPAROSCOPIC APPENDECTOMY;  Surgeon: Jesus Joiner MD;  [facility]    SYNOVECTOMY KNEE Right 7/25/2023    Procedure: synovectomy;  Surgeon: Pj Xiong MD;  [facility]    SYNOVECTOMY KNEE POSTERIOR Right 7/25/2023    Procedure: Right Knee Open Posterior Synovectomy;  Surgeon: Alexis Diane MD;  [facility]     Family Hx:  No family history on file.  Social Hx:  Social History     Tobacco Use    Smoking status: Never    Smokeless tobacco: Never   Vaping Use    Vaping Use: Former   Substance Use Topics    Drug use: Not Currently        Objective  Physical Exam   /67 (BP Location: Right arm, Patient Position: Sitting,  Cuff Size: Adult Large)   Pulse 87   Temp 98.3  F (36.8  C) (Oral)   Wt 78.9 kg (174 lb)   SpO2 98%   BMI 28.96 kg/m    General: alert, well appearing, no distress  HEENT:  clear conjunctiva,   Cardiac: Warm and well-perfused  Pulm: Breathing without difficulty on room air  MSK: Hand, wrist, elbow, and shoulder joints without evidence of synovitis or effusion. Intact and nonpainful range of motion  From previous exam: No hypermobility at the elbows, thumbs or pinkies.   Warmth and swelling of the right knee without TTP and someone reduced ROM, improved from prior. Left knee without warmth, swelling or TTP. Full ROM  From previous exam: Bilateral hips with full, nonpainful ROM.   Foot/ankle joints bilatearlly without erythema/effusion/tenderness to palpation and with intact nonpainful range of motion.   No pain over enthesial sites   Skin: No rashes, warm and well-perfused. No nail pitting, digital ulceration, dactylitis, or telangiectasias. No subcutaneous nodules.     Procedure     Right knee arthrocentesis  Indication: Synovitis  Risks of the procedure were reviewed with the patient which include but are not limited to risk of infection, pain, and bleeding at site of injection. Informed consent was obtained. Time out was performed.  The site was marked and the right knee joint was prepped in sterile fashion with chlorhexidine solution. Using a/n medial approach, the right knee joint space was accessed with a 21 G needle and aspiration was performed, which yielded 10 cc of serosanguineous synovial fluid. The knee was then injected with a solution of 60mg of Depo-Medrol mixed with 2cc of lidocaine, using a 3cc syringe.  Amount of fluid removed: 10 cc  Estimated Blood Loss: minimal  The patient tolerated the procedure well without any adverse events. They were instructed to rest, and intermittently ice the joint over the next 24 hours. They were instructed to call our office and come to the ER in case of a  fever, chills, excessive pain, or bleeding at the site of injection.       Eloise Mcgarry MD  Rheumatology

## 2023-11-17 NOTE — NURSING NOTE
Chief Complaint   Patient presents with    RECHECK     /67 (BP Location: Right arm, Patient Position: Sitting, Cuff Size: Adult Large)   Pulse 87   Temp 98.3  F (36.8  C) (Oral)   Wt 78.9 kg (174 lb)   SpO2 98%   BMI 28.96 kg/m

## 2023-11-20 LAB
ENA SM IGG SER IA-ACNC: 0.7 U/ML
ENA SM IGG SER IA-ACNC: NEGATIVE
ENA SS-A AB SER IA-ACNC: <0.5 U/ML
ENA SS-A AB SER IA-ACNC: NEGATIVE
ENA SS-B IGG SER IA-ACNC: <0.6 U/ML
ENA SS-B IGG SER IA-ACNC: NEGATIVE
U1 SNRNP IGG SER IA-ACNC: 1.6 U/ML
U1 SNRNP IGG SER IA-ACNC: NEGATIVE

## 2023-11-22 LAB
BACTERIA SNV CULT: NO GROWTH
GRAM STAIN RESULT: NORMAL
GRAM STAIN RESULT: NORMAL

## 2023-11-29 NOTE — PROGRESS NOTES
" RHEUMATOLOGY FOLLOW UP VISIT     Assessment & Plan     Problem List  Scoliosis  Chronic right knee synovitis s/p synovectomy and removal of loose body   +DAJUAN 1:160  DVT on Eliquis    Synovitis of knee  Comment: Right knee warmth and swelling since January with MRI results suspicious for possible pigmented villonodular synovitis versus synovial chondromatosis versus chronic synovitis.  Due to these results she underwent  synovectomy on July 25, 2023 with Dr. Diane, pathology results were negative for neoplasm and showed chronic inflammation (lymphocytes and numerous plasma cells).      Work up has been negative for RF, CCP, HLA-B27, Esr and crp now wnl, Negative quant gold, treponema abs and lyme serologies.  X-rays of the hands, wrists, SI joints, and knees from 11/2023 do not show signs of erosion joint space narrowing, bone production, or sacroiliitis.  Endorses history of intermittent knee and wrist pain since elementary school.  Has also had multiple issues with her spine felt to be secondary to scoliosis.  More recently only her right knee has been giving her issues.  Currently without other small joint involvement or prominent inflammatory back pain symptoms, but does endorse morning stiffness for over an hour over all her joints. \"I feel like the tin man\".     She was found to have a +DAJUAN 1:160 speckled, ENAs negative. Does have Raynaud's, SICCA symptoms, and recently some hand swelling and transient facial rash (although only lasted a day, not clearly malar rash of lupus). I do not currently have enough to give her a diagnosis of lupus, but will continue to monitor for this.  Given her history of a DVT (provoked) antiphospholipid antibodies were checked and were negative.    Unclear etiology of her chronic right knee synovitis, but could possibly be consistent with an underlying SACHI like picture in the spondylarthritis spectrum vs secondary to lupus, but monoarticular medium joint involvement is not " typical of lupus arthritis. No history of psoriasis, inflammatory bowel disease, uveitis, or inflammatory eye disease.    Today with great improvement of right knee synovitis follow intra-articular steroid injection on 11/17/23 . Fluid was negative for crystals or infection; 5,800 WBCs.     Plan:   -start naproxen 500mg BID prn for knee pain refractory to tylenol. Counseled pt regarding increased risk of bleeding with this medication while on Eliquis. Take with food.   -Resume physical therapy   -RTC in 1 month       History of DVT and PE  Comment: Induced in setting of any arthroscopy.  Currently on Eliquis.  Factor V Leiden negative.  No previous blood clots or miscarriages.  Has a family history of lupus. APS antibodies negative       Orders Placed This Encounter   Procedures    Physical Therapy Referral      Return to clinic in 4 weeks.      >40 minutes spent on the day of the encounter doing chart review, history and exam, counseling and documentation, not including time spent doing procedure.    Subjective     Her swelling her knee decreased after we removed fluid during her last appointment and injected it with steroids.  However, she reports she still has pain on the medial aspect of her knee comes on daily and randomly.  It is not necessarily brought on with activity.  She has been taking Tylenol periodically for the pain.  She is still on Eliquis for her blood clot.  She also reports that her hands intermittently swell up and she shows me a picture today of diffuse hand swelling.  There was also 1 day where she felt really crummy and during this day she had a red rash on her face that resolved by the next day.  She shows me a picture of the rash on her phone which reveals bright erythema over the Maller area but does not appear to spare the nasolabial folds.  She also went to see her eye doctor and was told she has very dry eyes and that her tear glands do not work normally.  She was told to let her doctor  know about this.  She denies dry mouth.  SSA and SSB were checked last visit and were negative.    RENETTA Cobos is a 23 yr old female with a PMHx of appendicitis with perforation and abscess formation and scoliosis who presents for evaluation of right knee synovitis.    She reports that she developed right knee pain and swelling in January and was seen by [facility].  Initially she was told it was patellofemoral pain syndrome, but when her pain and swelling continued to recur she had an MRI done of the right knee which was concerning for possible pigmented villonodular synovitis versus synovial chondromatosis versus chronic synovitis.  Due to these results she underwent  synovectomy on July 25, 2023 with Dr. Diane, pathology she results were negative for neoplasm and showed chronic inflammation (lymphocytes and numerous plasma cells).  She reports she never had any fluid drained from the knee or any steroid injections in that knee.  Her surgery was complicated by DVT and PE for which she is currently on Eliquis.  Hypercoagulability work-up with factor V Leiden was negative.  She denies any history of previous blood clots and has no history of miscarriages.  She has 1 healthy baby girl.    Although this is the first time she had subsignificant issues of her joints, she reports a history of knee pain starting back in elementary school, right more than left.  She cannot remember if she had swelling at the time as well.  Per chart review she had x-rays done of her knees in 2014 which were normal.  She also reports some intermittent wrist pain starting around the same time.  She has a long history of pain over her neck and back which is felt to be due to scoliosis.  She has never had to wear back brace or have any surgical intervention on her spine but has undergone physical therapy multiple times for this problem.  She also reports she was told she had a hairline fracture in her spine.  She reports her  back pain is sometimes worse with activity and sometimes bothers her after periods of rest.  It has woken her up at night in the past.  She also had severe bilateral hip pain with her pregnancy.  More recently, has not been a major concern for her.  Currently denies pain or swelling in any other joints.  She has no history of inflammatory eye disease or inflammatory bowel disease.  She had some sort of rash as a child that she believes was eczema or psoriasis, but currently without any skin issues or rashes.  No history of dactylitis.    Unclear if has Raynaud's.  Endorses two episodes of her middle finger turning blue and painful that self resolved.  She reports she sometimes has pain in her hands. No oral or nasal ulcers, no GERD, no skin tighting, no blood in urine or stool, no dyspnea, no cough, no chest pain, no fevers or weigh loss,  no dry eyes or dry mouth, no numbness or tingling, no muscle pain.     Reports her grandfather and mom were diagnosed with some sort of bone disease she does not know more details.  Reports her mom had rheumatoid arthritis, but unclear if she is on medication.  Dad has gout.  Paternal grandmother with lupus.      Lab and Imaging review:    I reviewed recent labs and imaging including:    Labs 11/03/23  Esr and crp wnl,   Negative quant gold, treponema abs, lyme  Negative HLAB27, APS antibodies, RF, CCP,  +DAJUAN 1:160 speckled    Labs 10/2023  CBC wnl, crp wnl (down from 20), esr wnl (down from 26),     Pathology report 7/25/2023    Chronic inflammation is constituted by lymphocytes and numerous plasma cells. The presence of the latter raises the possibility of  an immune mediated condition such as rheumatoid arthritis. Please correlate with clinical and serological information    Final Diagnosis  A. Right knee, posterior synovium, excision:  - Chronic hyperplastic synovitis  - Negative for acute inflammation and neoplasm  B. Right knee, anterior synovium, excision:  - Chronic  hyperplastic synovitis  - Negative for acute inflammation and neoplasm    MRI right knee 7/2023  IMPRESSION:   1. Large joint effusion with diffuse chronic synovitis or nodular synovitis.   Synovial chondromatosis or PVNS is a possibility. Inflammatory arthritis is a   possibility. Similar less pronounced involvement of the proximal tibiofibular   joint.   2. No internal derangement.       Current Outpatient Medications:     acetaminophen (TYLENOL) 325 MG tablet, Take 2 tablets (650 mg) by mouth every 4 hours as needed for mild pain, Disp: 50 tablet, Rfl: 0    ELIQUIS ANTICOAGULANT 5 MG tablet, Take 5 mg by mouth 2 times daily, Disp: , Rfl:     naproxen (NAPROSYN) 500 MG tablet, Take 1 tablet (500 mg) by mouth 2 times daily as needed for moderate pain (for knee pain, with meals), Disp: 30 tablet, Rfl: 3    amoxicillin-clavulanate (AUGMENTIN) 875-125 MG per tablet, Take 1 tablet by mouth 2 times daily (Patient not taking: Reported on 7/29/2023), Disp: 20 tablet, Rfl: 0    oxyCODONE (ROXICODONE) 5 MG tablet, Take 1-2 tablets (5-10 mg) by mouth every 4 hours as needed for moderate to severe pain (Patient not taking: Reported on 10/11/2023), Disp: 20 tablet, Rfl: 0    Current Facility-Administered Medications:     lidocaine (PF) (XYLOCAINE) 1 % injection 1 mL, 1 mL, INTRA-ARTICULAR, Once, Eloise Mcgarry MD    methylPREDNISolone (DEPO-Medrol) injection 60 mg, 60 mg, INTRA-ARTICULAR, Once, Eloise Mcgarry MD  Allergies:  No Known Allergies  Medical Hx:  No past medical history on file.  Surgical Hx:  Past Surgical History:   Procedure Laterality Date    ARTHROSCOPY KNEE Right 7/25/2023    Procedure: Right anterior knee arthroscopic;  Surgeon: Pj Xiong MD;  [facility]    LAPAROSCOPIC APPENDECTOMY N/A 5/8/2016    Procedure: LAPAROSCOPIC APPENDECTOMY;  Surgeon: Jesus Joiner MD;  [facility]    SYNOVECTOMY KNEE Right 7/25/2023    Procedure: synovectomy;   Surgeon: Pj Xiong MD;  [facility]    SYNOVECTOMY KNEE POSTERIOR Right 7/25/2023    Procedure: Right Knee Open Posterior Synovectomy;  Surgeon: Alexis Diane MD;  [facility]     Family Hx:  No family history on file.  Social Hx:  Social History     Tobacco Use    Smoking status: Never    Smokeless tobacco: Never   Vaping Use    Vaping Use: Former   Substance Use Topics    Drug use: Not Currently        Objective   Physical Exam   /71 (BP Location: Left arm, Patient Position: Sitting, Cuff Size: Adult Regular)   Pulse 94   Temp 98.1  F (36.7  C) (Oral)   Wt 80.7 kg (178 lb)   SpO2 92%   BMI 29.62 kg/m    General: alert, well appearing, no distress  HEENT:  clear conjunctiva, no facial rash  Cardiac: Warm and well-perfused  Pulm: Breathing without difficulty on room air  MSK: Hands no appears significantly swollen today and no focal synovitis of the MCP, PIP or DIP joints.  However, could be a little puffy depending on what her baseline is.  Able to make fists.  wrist, elbow, and shoulder joints without evidence of synovitis or effusion. Intact and nonpainful range of motion  Mild effusion over the right knee much improved from prior and no longer with warmth.  Tenderness to pain palpation over the medial aspect of the knee.  Left knee without swelling, tenderness, or pain to palpation.    From previous exam: Bilateral hips with full, nonpainful ROM.   Foot/ankle joints bilatearlly without erythema/effusion/tenderness to palpation and with intact nonpainful range of motion.   No pain over enthesial sites   Skin: No rashes, warm and well-perfused.        Eloise Mcgarry MD  Rheumatology

## 2023-12-01 ENCOUNTER — OFFICE VISIT (OUTPATIENT)
Dept: RHEUMATOLOGY | Facility: CLINIC | Age: 23
End: 2023-12-01
Attending: STUDENT IN AN ORGANIZED HEALTH CARE EDUCATION/TRAINING PROGRAM
Payer: COMMERCIAL

## 2023-12-01 VITALS
DIASTOLIC BLOOD PRESSURE: 71 MMHG | OXYGEN SATURATION: 92 % | HEART RATE: 94 BPM | TEMPERATURE: 98.1 F | BODY MASS INDEX: 29.62 KG/M2 | SYSTOLIC BLOOD PRESSURE: 121 MMHG | WEIGHT: 178 LBS

## 2023-12-01 DIAGNOSIS — M65.969 SYNOVITIS OF KNEE: Primary | ICD-10-CM

## 2023-12-01 PROCEDURE — 99214 OFFICE O/P EST MOD 30 MIN: CPT | Performed by: STUDENT IN AN ORGANIZED HEALTH CARE EDUCATION/TRAINING PROGRAM

## 2023-12-01 PROCEDURE — G0463 HOSPITAL OUTPT CLINIC VISIT: HCPCS | Performed by: STUDENT IN AN ORGANIZED HEALTH CARE EDUCATION/TRAINING PROGRAM

## 2023-12-01 RX ORDER — NAPROXEN 500 MG/1
500 TABLET ORAL 2 TIMES DAILY PRN
Qty: 30 TABLET | Refills: 3 | Status: SHIPPED | OUTPATIENT
Start: 2023-12-01

## 2023-12-01 ASSESSMENT — PAIN SCALES - GENERAL: PAINLEVEL: NO PAIN (0)

## 2023-12-01 NOTE — NURSING NOTE
Chief Complaint   Patient presents with    RECHECK   /71 (BP Location: Left arm, Patient Position: Sitting, Cuff Size: Adult Regular)   Pulse 94   Temp 98.1  F (36.7  C) (Oral)   Wt 80.7 kg (178 lb)   SpO2 92%   BMI 29.62 kg/m  Violette Ortiz on 12/1/2023 at 11:33 AM

## 2023-12-01 NOTE — LETTER
"12/1/2023       RE: Muna Cobos  [address]  [facility] [address]     Dear Colleague,    Thank you for referring your patient, Muna Cobos, to the [facility] RHEUMATOLOGY at [facility]. Please see a copy of my visit note below.     RHEUMATOLOGY FOLLOW UP VISIT     Assessment & Plan    Problem List  Scoliosis  Chronic right knee synovitis s/p synovectomy and removal of loose body   +DAJUAN 1:160  DVT on Eliquis    Synovitis of knee  Comment: Right knee warmth and swelling since January with MRI results suspicious for possible pigmented villonodular synovitis versus synovial chondromatosis versus chronic synovitis.  Due to these results she underwent  synovectomy on July 25, 2023 with Dr. Diane, pathology results were negative for neoplasm and showed chronic inflammation (lymphocytes and numerous plasma cells).      Work up has been negative for RF, CCP, HLA-B27, Esr and crp now wnl, Negative quant gold, treponema abs and lyme serologies.  X-rays of the hands, wrists, SI joints, and knees from 11/2023 do not show signs of erosion joint space narrowing, bone production, or sacroiliitis.  Endorses history of intermittent knee and wrist pain since elementary school.  Has also had multiple issues with her spine felt to be secondary to scoliosis.  More recently only her right knee has been giving her issues.  Currently without other small joint involvement or prominent inflammatory back pain symptoms, but does endorse morning stiffness for over an hour over all her joints. \"I feel like the tin man\".     She was found to have a +DAJUAN 1:160 speckled, ENAs negative. Does have Raynaud's, SICCA symptoms, and recently some hand swelling and transient facial rash (although only lasted a day, not clearly malar rash of lupus). I do not currently have enough to give her a diagnosis of lupus, but will continue to monitor for this.  Given her history of a DVT (provoked) " antiphospholipid antibodies were checked and were negative.    Unclear etiology of her chronic right knee synovitis, but could possibly be consistent with an underlying SACHI like picture in the spondylarthritis spectrum vs secondary to lupus, but monoarticular medium joint involvement is not typical of lupus arthritis. No history of psoriasis, inflammatory bowel disease, uveitis, or inflammatory eye disease.    Today with great improvement of right knee synovitis follow intra-articular steroid injection on 11/17/23 . Fluid was negative for crystals or infection; 5,800 WBCs.     Plan:   -start naproxen 500mg BID prn for knee pain refractory to tylenol. Counseled pt regarding increased risk of bleeding with this medication while on Eliquis. Take with food.   -Resume physical therapy   -RTC in 1 month       History of DVT and PE  Comment: Induced in setting of any arthroscopy.  Currently on Eliquis.  Factor V Leiden negative.  No previous blood clots or miscarriages.  Has a family history of lupus. APS antibodies negative       Orders Placed This Encounter   Procedures    Physical Therapy Referral      Return to clinic in 4 weeks.      >40 minutes spent on the day of the encounter doing chart review, history and exam, counseling and documentation, not including time spent doing procedure.    Subjective    Her swelling her knee decreased after we removed fluid during her last appointment and injected it with steroids.  However, she reports she still has pain on the medial aspect of her knee comes on daily and randomly.  It is not necessarily brought on with activity.  She has been taking Tylenol periodically for the pain.  She is still on Eliquis for her blood clot.  She also reports that her hands intermittently swell up and she shows me a picture today of diffuse hand swelling.  There was also 1 day where she felt really crummy and during this day she had a red rash on her face that resolved by the next day.  She shows  me a picture of the rash on her phone which reveals bright erythema over the Maller area but does not appear to spare the nasolabial folds.  She also went to see her eye doctor and was told she has very dry eyes and that her tear glands do not work normally.  She was told to let her doctor know about this.  She denies dry mouth.  SSA and SSB were checked last visit and were negative.    RENETTA Cobos is a 23 yr old female with a PMHx of appendicitis with perforation and abscess formation and scoliosis who presents for evaluation of right knee synovitis.    She reports that she developed right knee pain and swelling in January and was seen by [facility].  Initially she was told it was patellofemoral pain syndrome, but when her pain and swelling continued to recur she had an MRI done of the right knee which was concerning for possible pigmented villonodular synovitis versus synovial chondromatosis versus chronic synovitis.  Due to these results she underwent  synovectomy on July 25, 2023 with Dr. Diane, pathology she results were negative for neoplasm and showed chronic inflammation (lymphocytes and numerous plasma cells).  She reports she never had any fluid drained from the knee or any steroid injections in that knee.  Her surgery was complicated by DVT and PE for which she is currently on Eliquis.  Hypercoagulability work-up with factor V Leiden was negative.  She denies any history of previous blood clots and has no history of miscarriages.  She has 1 healthy baby girl.    Although this is the first time she had subsignificant issues of her joints, she reports a history of knee pain starting back in elementary school, right more than left.  She cannot remember if she had swelling at the time as well.  Per chart review she had x-rays done of her knees in 2014 which were normal.  She also reports some intermittent wrist pain starting around the same time.  She has a long history of pain over her neck  and back which is felt to be due to scoliosis.  She has never had to wear back brace or have any surgical intervention on her spine but has undergone physical therapy multiple times for this problem.  She also reports she was told she had a hairline fracture in her spine.  She reports her back pain is sometimes worse with activity and sometimes bothers her after periods of rest.  It has woken her up at night in the past.  She also had severe bilateral hip pain with her pregnancy.  More recently, has not been a major concern for her.  Currently denies pain or swelling in any other joints.  She has no history of inflammatory eye disease or inflammatory bowel disease.  She had some sort of rash as a child that she believes was eczema or psoriasis, but currently without any skin issues or rashes.  No history of dactylitis.    Unclear if has Raynaud's.  Endorses two episodes of her middle finger turning blue and painful that self resolved.  She reports she sometimes has pain in her hands. No oral or nasal ulcers, no GERD, no skin tighting, no blood in urine or stool, no dyspnea, no cough, no chest pain, no fevers or weigh loss,  no dry eyes or dry mouth, no numbness or tingling, no muscle pain.     Reports her grandfather and mom were diagnosed with some sort of bone disease she does not know more details.  Reports her mom had rheumatoid arthritis, but unclear if she is on medication.  Dad has gout.  Paternal grandmother with lupus.      Lab and Imaging review:    I reviewed recent labs and imaging including:    Labs 11/03/23  Esr and crp wnl,   Negative quant gold, treponema abs, lyme  Negative HLAB27, APS antibodies, RF, CCP,  +DAJUAN 1:160 speckled    Labs 10/2023  CBC wnl, crp wnl (down from 20), esr wnl (down from 26),     Pathology report 7/25/2023    Chronic inflammation is constituted by lymphocytes and numerous plasma cells. The presence of the latter raises the possibility of  an immune mediated condition such as  rheumatoid arthritis. Please correlate with clinical and serological information    Final Diagnosis  A. Right knee, posterior synovium, excision:  - Chronic hyperplastic synovitis  - Negative for acute inflammation and neoplasm  B. Right knee, anterior synovium, excision:  - Chronic hyperplastic synovitis  - Negative for acute inflammation and neoplasm    MRI right knee 7/2023  IMPRESSION:   1. Large joint effusion with diffuse chronic synovitis or nodular synovitis.   Synovial chondromatosis or PVNS is a possibility. Inflammatory arthritis is a   possibility. Similar less pronounced involvement of the proximal tibiofibular   joint.   2. No internal derangement.       Current Outpatient Medications:     acetaminophen (TYLENOL) 325 MG tablet, Take 2 tablets (650 mg) by mouth every 4 hours as needed for mild pain, Disp: 50 tablet, Rfl: 0    ELIQUIS ANTICOAGULANT 5 MG tablet, Take 5 mg by mouth 2 times daily, Disp: , Rfl:     naproxen (NAPROSYN) 500 MG tablet, Take 1 tablet (500 mg) by mouth 2 times daily as needed for moderate pain (for knee pain, with meals), Disp: 30 tablet, Rfl: 3    amoxicillin-clavulanate (AUGMENTIN) 875-125 MG per tablet, Take 1 tablet by mouth 2 times daily (Patient not taking: Reported on 7/29/2023), Disp: 20 tablet, Rfl: 0    oxyCODONE (ROXICODONE) 5 MG tablet, Take 1-2 tablets (5-10 mg) by mouth every 4 hours as needed for moderate to severe pain (Patient not taking: Reported on 10/11/2023), Disp: 20 tablet, Rfl: 0    Current Facility-Administered Medications:     lidocaine (PF) (XYLOCAINE) 1 % injection 1 mL, 1 mL, INTRA-ARTICULAR, Once, Eloise Mcgarry MD    methylPREDNISolone (DEPO-Medrol) injection 60 mg, 60 mg, INTRA-ARTICULAR, Once, Eloise Mcgarry MD  Allergies:  No Known Allergies  Medical Hx:  No past medical history on file.  Surgical Hx:  Past Surgical History:   Procedure Laterality Date    ARTHROSCOPY KNEE Right 7/25/2023    Procedure: Right  anterior knee arthroscopic;  Surgeon: Pj Xiong MD;  [facility]    LAPAROSCOPIC APPENDECTOMY N/A 5/8/2016    Procedure: LAPAROSCOPIC APPENDECTOMY;  Surgeon: Jesus Joiner MD;  [facility]    SYNOVECTOMY KNEE Right 7/25/2023    Procedure: synovectomy;  Surgeon: Pj Xiong MD;  [facility]    SYNOVECTOMY KNEE POSTERIOR Right 7/25/2023    Procedure: Right Knee Open Posterior Synovectomy;  Surgeon: Alexis Diane MD;  [facility]     Family Hx:  No family history on file.  Social Hx:  Social History     Tobacco Use    Smoking status: Never    Smokeless tobacco: Never   Vaping Use    Vaping Use: Former   Substance Use Topics    Drug use: Not Currently        Objective  Physical Exam   /71 (BP Location: Left arm, Patient Position: Sitting, Cuff Size: Adult Regular)   Pulse 94   Temp 98.1  F (36.7  C) (Oral)   Wt 80.7 kg (178 lb)   SpO2 92%   BMI 29.62 kg/m    General: alert, well appearing, no distress  HEENT:  clear conjunctiva, no facial rash  Cardiac: Warm and well-perfused  Pulm: Breathing without difficulty on room air  MSK: Hands no appears significantly swollen today and no focal synovitis of the MCP, PIP or DIP joints.  However, could be a little puffy depending on what her baseline is.  Able to make fists.  wrist, elbow, and shoulder joints without evidence of synovitis or effusion. Intact and nonpainful range of motion  Mild effusion over the right knee much improved from prior and no longer with warmth.  Tenderness to pain palpation over the medial aspect of the knee.  Left knee without swelling, tenderness, or pain to palpation.    From previous exam: Bilateral hips with full, nonpainful ROM.   Foot/ankle joints bilatearlly without erythema/effusion/tenderness to palpation and with intact nonpainful range of motion.   No pain over enthesial sites   Skin: No rashes, warm and well-perfused.        Eloise Mcgarry,  MD  Rheumatology

## 2023-12-01 NOTE — PATIENT INSTRUCTIONS
You can take Naproxen 500mg twice a day as needed for knee pain. However, with your blood thinner, this will increase your risk of bleeding. Avoid any high impact activities. Take with food and stop and notify me if you develop stomach pain or black stools.     I recommend re-starting physical therapy for your knee.     Send me a message on [employer] if you develop any other concerning symptoms. You can also send me picture through [employer].

## 2024-01-04 NOTE — PROGRESS NOTES
" RHEUMATOLOGY FOLLOW UP VISIT     Assessment & Plan     Problem List  Scoliosis  Chronic right knee synovitis s/p synovectomy and removal of loose body   +DAJUAN 1:160  DVT on Eliquis    Synovitis of knee  Comment: Right knee warmth and swelling since January with MRI results suspicious for possible pigmented villonodular synovitis versus synovial chondromatosis versus chronic synovitis.  Due to these results she underwent  synovectomy on July 25, 2023 with Dr. Diane, pathology results were negative for neoplasm and showed chronic inflammation (lymphocytes and numerous plasma cells).      Work up has been negative for RF, CCP, HLA-B27, Esr and crp now wnl, Negative quant gold, treponema abs and lyme serologies.  X-rays of the hands, wrists, SI joints, and knees from 11/2023 do not show signs of erosion joint space narrowing, bone production, or sacroiliitis.  Endorses history of intermittent knee and wrist pain since elementary school.  Has also had multiple issues with her spine felt to be secondary to scoliosis.  More recently only her right knee has been giving her issues.  Currently without other small joint involvement or prominent inflammatory back pain symptoms, but does endorse morning stiffness for over an hour over all her joints. \"I feel like the tin man\".     She was found to have a +DAJUAN 1:160 speckled, negative dsDNA, Molina, RNP, SSA, SSB, complements wnl. Does have Raynaud's, SICCA symptoms, and recently some hand swelling and transient facial rash (although only lasted a day, not clearly malar rash of lupus). I do not currently have enough to give her a diagnosis of lupus, but will continue to monitor for this.  Given her history of a DVT (provoked) antiphospholipid antibodies were checked and were negative.    Unclear etiology of her chronic right knee synovitis, but could possibly be consistent with an underlying SACHI like picture in the spondylarthritis spectrum vs secondary to lupus, but " monoarticular medium joint involvement is not typical of lupus arthritis. No history of psoriasis, inflammatory bowel disease, uveitis, or inflammatory eye disease.    Great improvement of right knee synovitis follow intra-articular steroid injection on 11/17/23 . Fluid was negative for crystals or infection; 5,800 WBCs.   Inlammatory markers are now wnl an no longer with synovitis on exam, but does continue to have some soft tissue swelling and pain over her right knee with certain activities.     Plan:   -encouraged initiation of PT for continued knee pain   -RTC in 3 months or sooner if issues develop        History of DVT and PE  Comment: Induced in setting of arthroscopy.  Currently on Eliquis.  Factor V Leiden negative.  No previous blood clots or miscarriages.  Has a family history of lupus. APS antibodies negative   -management per PCP  -repeat ultrasound today per pt request as continues to have pain in her calf       Orders Placed This Encounter   Procedures    US Lower Extremity Venous Duplex Right      Return to clinic in 3 months    30 minutes spent on the day of the encounter doing chart review, history and exam, counseling and documentation, not including time spent doing procedure.    Subjective     Reports her right knee feels about the same, no worsening or recurrence of swelling.  She still has pain over her knee when she squats and goes downstairs.  She has continued numbness over the side of her leg pain over her calf when she rests on something.  She is concerned that the clot may have gotten worse.  She continues on Eliquis without missed doses.  Since I last saw her she saw an outside rheumatologist Dr. Hoffman from [facility] for second opinion.  DAJUAN was rechecked and sent to [place] and confirmed positivity.  Was counseled to return to her care if develops recurrence of her arthritis or other signs or symptoms concerning for autoimmune disease.  She has not had a recurrence of any  matt.      RENETTA Cobos is a 23 yr old female with a PMHx of appendicitis with perforation and abscess formation and scoliosis who presents for evaluation of right knee synovitis.    She reports that she developed right knee pain and swelling in January and was seen by [facility].  Initially she was told it was patellofemoral pain syndrome, but when her pain and swelling continued to recur she had an MRI done of the right knee which was concerning for possible pigmented villonodular synovitis versus synovial chondromatosis versus chronic synovitis.  Due to these results she underwent  synovectomy on July 25, 2023 with Dr. Diane, pathology she results were negative for neoplasm and showed chronic inflammation (lymphocytes and numerous plasma cells).  She reports she never had any fluid drained from the knee or any steroid injections in that knee.  Her surgery was complicated by DVT and PE for which she is currently on Eliquis.  Hypercoagulability work-up with factor V Leiden was negative.  She denies any history of previous blood clots and has no history of miscarriages.  She has 1 healthy baby girl.    Although this is the first time she had subsignificant issues of her joints, she reports a history of knee pain starting back in elementary school, right more than left.  She cannot remember if she had swelling at the time as well.  Per chart review she had x-rays done of her knees in 2014 which were normal.  She also reports some intermittent wrist pain starting around the same time.  She has a long history of pain over her neck and back which is felt to be due to scoliosis.  She has never had to wear back brace or have any surgical intervention on her spine but has undergone physical therapy multiple times for this problem.  She also reports she was told she had a hairline fracture in her spine.  She reports her back pain is sometimes worse with activity and sometimes bothers her after periods of  rest.  It has woken her up at night in the past.  She also had severe bilateral hip pain with her pregnancy.  More recently, has not been a major concern for her.  Currently denies pain or swelling in any other joints.  She has no history of inflammatory eye disease or inflammatory bowel disease.  She had some sort of rash as a child that she believes was eczema or psoriasis, but currently without any skin issues or rashes.  No history of dactylitis.    Unclear if has Raynaud's.  Endorses two episodes of her middle finger turning blue and painful that self resolved.  She reports she sometimes has pain in her hands. No oral or nasal ulcers, no GERD, no skin tighting, no blood in urine or stool, no dyspnea, no cough, no chest pain, no fevers or weigh loss,  no dry eyes or dry mouth, no numbness or tingling, no muscle pain.     Mother had rheumatoid arthritis- passed age 39- accident.  Father has gout. Alcoholism.   Paternal grandmother has lupus.       Lab and Imaging review:    I reviewed recent labs and imaging including:    Labs 11/03/23  Esr and crp wnl,   Negative quant gold, treponema abs, lyme  Negative HLAB27, APS antibodies, RF, CCP,  +DAJUAN 1:160 speckled    Labs 10/2023  CBC wnl, crp wnl (down from 20), esr wnl (down from 26),     Pathology report 7/25/2023    Chronic inflammation is constituted by lymphocytes and numerous plasma cells. The presence of the latter raises the possibility of  an immune mediated condition such as rheumatoid arthritis. Please correlate with clinical and serological information    Final Diagnosis  A. Right knee, posterior synovium, excision:  - Chronic hyperplastic synovitis  - Negative for acute inflammation and neoplasm  B. Right knee, anterior synovium, excision:  - Chronic hyperplastic synovitis  - Negative for acute inflammation and neoplasm    MRI right knee 7/2023  IMPRESSION:   1. Large joint effusion with diffuse chronic synovitis or nodular synovitis.   Synovial  chondromatosis or PVNS is a possibility. Inflammatory arthritis is a   possibility. Similar less pronounced involvement of the proximal tibiofibular   joint.   2. No internal derangement.       Current Outpatient Medications:     acetaminophen (TYLENOL) 325 MG tablet, Take 2 tablets (650 mg) by mouth every 4 hours as needed for mild pain, Disp: 50 tablet, Rfl: 0    ELIQUIS ANTICOAGULANT 5 MG tablet, Take 5 mg by mouth 2 times daily, Disp: , Rfl:     naproxen (NAPROSYN) 500 MG tablet, Take 1 tablet (500 mg) by mouth 2 times daily as needed for moderate pain (for knee pain, with meals), Disp: 30 tablet, Rfl: 3    amoxicillin-clavulanate (AUGMENTIN) 875-125 MG per tablet, Take 1 tablet by mouth 2 times daily (Patient not taking: Reported on 7/29/2023), Disp: 20 tablet, Rfl: 0    oxyCODONE (ROXICODONE) 5 MG tablet, Take 1-2 tablets (5-10 mg) by mouth every 4 hours as needed for moderate to severe pain (Patient not taking: Reported on 10/11/2023), Disp: 20 tablet, Rfl: 0    Current Facility-Administered Medications:     lidocaine (PF) (XYLOCAINE) 1 % injection 1 mL, 1 mL, INTRA-ARTICULAR, Once, Eloise Mcgarry MD    methylPREDNISolone (DEPO-Medrol) injection 60 mg, 60 mg, INTRA-ARTICULAR, Once, Eloise Mcgarry MD  Allergies:  No Known Allergies  Medical Hx:  No past medical history on file.  Surgical Hx:  Past Surgical History:   Procedure Laterality Date    ARTHROSCOPY KNEE Right 7/25/2023    Procedure: Right anterior knee arthroscopic;  Surgeon: Pj Xiong MD;  [facility]    LAPAROSCOPIC APPENDECTOMY N/A 5/8/2016    Procedure: LAPAROSCOPIC APPENDECTOMY;  Surgeon: Jesus Joiner MD;  [facility]    SYNOVECTOMY KNEE Right 7/25/2023    Procedure: synovectomy;  Surgeon: Pj Xiong MD;  [facility]    SYNOVECTOMY KNEE POSTERIOR Right 7/25/2023    Procedure: Right Knee Open Posterior Synovectomy;  Surgeon: Alexis Diane MD;   [facility]     Family Hx:  No family history on file.  Social Hx:  Social History     Tobacco Use    Smoking status: Never    Smokeless tobacco: Never   Vaping Use    Vaping Use: Former   Substance Use Topics    Drug use: Not Currently        Objective   Physical Exam   /69 (BP Location: Right arm, Patient Position: Sitting, Cuff Size: Adult Regular)   Pulse 97   Wt 79.8 kg (176 lb)   SpO2 97%   BMI 29.29 kg/m    General: alert, well appearing, no distress  HEENT:  clear conjunctiva, no facial rash  Cardiac: Warm and well-perfused  Pulm: Breathing without difficulty on room air  MSK: Hands without swelling or focal synovitis of the MCP, PIP or DIP joints.    Right knee and lower leg mildly swollen compared to left , but right knee without warmth or synovitis, no TTP, full nonpainful ROM, left knee no synovitis and full ROM  From previous exam: Bilateral hips with full, nonpainful ROM.   Foot/ankle joints bilatearlly without erythema/effusion/tenderness to palpation and with intact nonpainful range of motion.   No pain over enthesial sites   Skin: No rashes, warm and well-perfused.        Eloise Mcgarry MD  Rheumatology

## 2024-01-05 ENCOUNTER — OFFICE VISIT (OUTPATIENT)
Dept: RHEUMATOLOGY | Facility: CLINIC | Age: 24
End: 2024-01-05
Attending: STUDENT IN AN ORGANIZED HEALTH CARE EDUCATION/TRAINING PROGRAM
Payer: COMMERCIAL

## 2024-01-05 VITALS
BODY MASS INDEX: 29.29 KG/M2 | HEART RATE: 97 BPM | OXYGEN SATURATION: 97 % | SYSTOLIC BLOOD PRESSURE: 117 MMHG | DIASTOLIC BLOOD PRESSURE: 69 MMHG | WEIGHT: 176 LBS

## 2024-01-05 DIAGNOSIS — I82.401 ACUTE DEEP VEIN THROMBOSIS (DVT) OF RIGHT LOWER EXTREMITY, UNSPECIFIED VEIN (H): Primary | ICD-10-CM

## 2024-01-05 PROCEDURE — G0463 HOSPITAL OUTPT CLINIC VISIT: HCPCS | Performed by: STUDENT IN AN ORGANIZED HEALTH CARE EDUCATION/TRAINING PROGRAM

## 2024-01-05 PROCEDURE — 99214 OFFICE O/P EST MOD 30 MIN: CPT | Performed by: STUDENT IN AN ORGANIZED HEALTH CARE EDUCATION/TRAINING PROGRAM

## 2024-01-05 ASSESSMENT — PAIN SCALES - GENERAL: PAINLEVEL: NO PAIN (0)

## 2024-01-05 NOTE — NURSING NOTE
Chief Complaint   Patient presents with    RECHECK     /69 (BP Location: Right arm, Patient Position: Sitting, Cuff Size: Adult Regular)   Pulse 97   Wt 79.8 kg (176 lb)   SpO2 97%   BMI 29.29 kg/m

## 2024-01-05 NOTE — LETTER
"1/5/2024       RE: Muna Cobos  [address]  [facility] [address]     Dear Colleague,    Thank you for referring your patient, Muna Cobos, to the [facility] RHEUMATOLOGY at [facility]. Please see a copy of my visit note below.     RHEUMATOLOGY FOLLOW UP VISIT     Assessment & Plan    Problem List  Scoliosis  Chronic right knee synovitis s/p synovectomy and removal of loose body   +DAJUAN 1:160  DVT on Eliquis    Synovitis of knee  Comment: Right knee warmth and swelling since January with MRI results suspicious for possible pigmented villonodular synovitis versus synovial chondromatosis versus chronic synovitis.  Due to these results she underwent  synovectomy on July 25, 2023 with Dr. Diane, pathology results were negative for neoplasm and showed chronic inflammation (lymphocytes and numerous plasma cells).      Work up has been negative for RF, CCP, HLA-B27, Esr and crp now wnl, Negative quant gold, treponema abs and lyme serologies.  X-rays of the hands, wrists, SI joints, and knees from 11/2023 do not show signs of erosion joint space narrowing, bone production, or sacroiliitis.  Endorses history of intermittent knee and wrist pain since elementary school.  Has also had multiple issues with her spine felt to be secondary to scoliosis.  More recently only her right knee has been giving her issues.  Currently without other small joint involvement or prominent inflammatory back pain symptoms, but does endorse morning stiffness for over an hour over all her joints. \"I feel like the tin man\".     She was found to have a +DAJUAN 1:160 speckled, negative dsDNA, Molina, RNP, SSA, SSB, complements wnl. Does have Raynaud's, SICCA symptoms, and recently some hand swelling and transient facial rash (although only lasted a day, not clearly malar rash of lupus). I do not currently have enough to give her a diagnosis of lupus, but will continue to monitor for this.  " Given her history of a DVT (provoked) antiphospholipid antibodies were checked and were negative.    Unclear etiology of her chronic right knee synovitis, but could possibly be consistent with an underlying SACHI like picture in the spondylarthritis spectrum vs secondary to lupus, but monoarticular medium joint involvement is not typical of lupus arthritis. No history of psoriasis, inflammatory bowel disease, uveitis, or inflammatory eye disease.    Great improvement of right knee synovitis follow intra-articular steroid injection on 11/17/23 . Fluid was negative for crystals or infection; 5,800 WBCs.   Inlammatory markers are now wnl an no longer with synovitis on exam, but does continue to have some soft tissue swelling and pain over her right knee with certain activities.     Plan:   -encouraged initiation of PT for continued knee pain   -RTC in 3 months or sooner if issues develop        History of DVT and PE  Comment: Induced in setting of arthroscopy.  Currently on Eliquis.  Factor V Leiden negative.  No previous blood clots or miscarriages.  Has a family history of lupus. APS antibodies negative   -management per PCP  -repeat ultrasound today per pt request as continues to have pain in her calf       Orders Placed This Encounter   Procedures    US Lower Extremity Venous Duplex Right      Return to clinic in 3 months    30 minutes spent on the day of the encounter doing chart review, history and exam, counseling and documentation, not including time spent doing procedure.    Subjective    Reports her right knee feels about the same, no worsening or recurrence of swelling.  She still has pain over her knee when she squats and goes downstairs.  She has continued numbness over the side of her leg pain over her calf when she rests on something.  She is concerned that the clot may have gotten worse.  She continues on Eliquis without missed doses.  Since I last saw her she saw an outside rheumatologist Dr. Hoffman  from [facility] for second opinion.  DAJUAN was rechecked and sent to [place] and confirmed positivity.  Was counseled to return to her care if develops recurrence of her arthritis or other signs or symptoms concerning for autoimmune disease.  She has not had a recurrence of any rashes.      RENETTA Cobos is a 23 yr old female with a PMHx of appendicitis with perforation and abscess formation and scoliosis who presents for evaluation of right knee synovitis.    She reports that she developed right knee pain and swelling in January and was seen by [facility].  Initially she was told it was patellofemoral pain syndrome, but when her pain and swelling continued to recur she had an MRI done of the right knee which was concerning for possible pigmented villonodular synovitis versus synovial chondromatosis versus chronic synovitis.  Due to these results she underwent  synovectomy on July 25, 2023 with Dr. Diane, pathology she results were negative for neoplasm and showed chronic inflammation (lymphocytes and numerous plasma cells).  She reports she never had any fluid drained from the knee or any steroid injections in that knee.  Her surgery was complicated by DVT and PE for which she is currently on Eliquis.  Hypercoagulability work-up with factor V Leiden was negative.  She denies any history of previous blood clots and has no history of miscarriages.  She has 1 healthy baby girl.    Although this is the first time she had subsignificant issues of her joints, she reports a history of knee pain starting back in elementary school, right more than left.  She cannot remember if she had swelling at the time as well.  Per chart review she had x-rays done of her knees in 2014 which were normal.  She also reports some intermittent wrist pain starting around the same time.  She has a long history of pain over her neck and back which is felt to be due to scoliosis.  She has never had to wear back brace or have any  surgical intervention on her spine but has undergone physical therapy multiple times for this problem.  She also reports she was told she had a hairline fracture in her spine.  She reports her back pain is sometimes worse with activity and sometimes bothers her after periods of rest.  It has woken her up at night in the past.  She also had severe bilateral hip pain with her pregnancy.  More recently, has not been a major concern for her.  Currently denies pain or swelling in any other joints.  She has no history of inflammatory eye disease or inflammatory bowel disease.  She had some sort of rash as a child that she believes was eczema or psoriasis, but currently without any skin issues or rashes.  No history of dactylitis.    Unclear if has Raynaud's.  Endorses two episodes of her middle finger turning blue and painful that self resolved.  She reports she sometimes has pain in her hands. No oral or nasal ulcers, no GERD, no skin tighting, no blood in urine or stool, no dyspnea, no cough, no chest pain, no fevers or weigh loss,  no dry eyes or dry mouth, no numbness or tingling, no muscle pain.     Mother had rheumatoid arthritis- passed age 39- accident.  Father has gout. Alcoholism.   Paternal grandmother has lupus.       Lab and Imaging review:    I reviewed recent labs and imaging including:    Labs 11/03/23  Esr and crp wnl,   Negative quant gold, treponema abs, lyme  Negative HLAB27, APS antibodies, RF, CCP,  +DAJUAN 1:160 speckled    Labs 10/2023  CBC wnl, crp wnl (down from 20), esr wnl (down from 26),     Pathology report 7/25/2023    Chronic inflammation is constituted by lymphocytes and numerous plasma cells. The presence of the latter raises the possibility of  an immune mediated condition such as rheumatoid arthritis. Please correlate with clinical and serological information    Final Diagnosis  A. Right knee, posterior synovium, excision:  - Chronic hyperplastic synovitis  - Negative for acute  inflammation and neoplasm  B. Right knee, anterior synovium, excision:  - Chronic hyperplastic synovitis  - Negative for acute inflammation and neoplasm    MRI right knee 7/2023  IMPRESSION:   1. Large joint effusion with diffuse chronic synovitis or nodular synovitis.   Synovial chondromatosis or PVNS is a possibility. Inflammatory arthritis is a   possibility. Similar less pronounced involvement of the proximal tibiofibular   joint.   2. No internal derangement.       Current Outpatient Medications:     acetaminophen (TYLENOL) 325 MG tablet, Take 2 tablets (650 mg) by mouth every 4 hours as needed for mild pain, Disp: 50 tablet, Rfl: 0    ELIQUIS ANTICOAGULANT 5 MG tablet, Take 5 mg by mouth 2 times daily, Disp: , Rfl:     naproxen (NAPROSYN) 500 MG tablet, Take 1 tablet (500 mg) by mouth 2 times daily as needed for moderate pain (for knee pain, with meals), Disp: 30 tablet, Rfl: 3    amoxicillin-clavulanate (AUGMENTIN) 875-125 MG per tablet, Take 1 tablet by mouth 2 times daily (Patient not taking: Reported on 7/29/2023), Disp: 20 tablet, Rfl: 0    oxyCODONE (ROXICODONE) 5 MG tablet, Take 1-2 tablets (5-10 mg) by mouth every 4 hours as needed for moderate to severe pain (Patient not taking: Reported on 10/11/2023), Disp: 20 tablet, Rfl: 0    Current Facility-Administered Medications:     lidocaine (PF) (XYLOCAINE) 1 % injection 1 mL, 1 mL, INTRA-ARTICULAR, Once, Eloise Mcgarry MD    methylPREDNISolone (DEPO-Medrol) injection 60 mg, 60 mg, INTRA-ARTICULAR, Once, Eloise Mcgarry MD  Allergies:  No Known Allergies  Medical Hx:  No past medical history on file.  Surgical Hx:  Past Surgical History:   Procedure Laterality Date    ARTHROSCOPY KNEE Right 7/25/2023    Procedure: Right anterior knee arthroscopic;  Surgeon: Pj Xiong MD;  [facility]    LAPAROSCOPIC APPENDECTOMY N/A 5/8/2016    Procedure: LAPAROSCOPIC APPENDECTOMY;  Surgeon: Jesus Joiner,  MD;  [facility]    SYNOVECTOMY KNEE Right 7/25/2023    Procedure: synovectomy;  Surgeon: Pj Xiong MD;  [facility]    SYNOVECTOMY KNEE POSTERIOR Right 7/25/2023    Procedure: Right Knee Open Posterior Synovectomy;  Surgeon: Alexis Diane MD;  [facility]     Family Hx:  No family history on file.  Social Hx:  Social History     Tobacco Use    Smoking status: Never    Smokeless tobacco: Never   Vaping Use    Vaping Use: Former   Substance Use Topics    Drug use: Not Currently        Objective  Physical Exam   /69 (BP Location: Right arm, Patient Position: Sitting, Cuff Size: Adult Regular)   Pulse 97   Wt 79.8 kg (176 lb)   SpO2 97%   BMI 29.29 kg/m    General: alert, well appearing, no distress  HEENT:  clear conjunctiva, no facial rash  Cardiac: Warm and well-perfused  Pulm: Breathing without difficulty on room air  MSK: Hands without swelling or focal synovitis of the MCP, PIP or DIP joints.    Right knee and lower leg mildly swollen compared to left , but right knee without warmth or synovitis, no TTP, full nonpainful ROM, left knee no synovitis and full ROM  From previous exam: Bilateral hips with full, nonpainful ROM.   Foot/ankle joints bilatearlly without erythema/effusion/tenderness to palpation and with intact nonpainful range of motion.   No pain over enthesial sites   Skin: No rashes, warm and well-perfused.        Eloise Mcgarry MD  Rheumatology

## 2024-01-08 ENCOUNTER — ANCILLARY PROCEDURE (OUTPATIENT)
Dept: ULTRASOUND IMAGING | Facility: CLINIC | Age: 24
End: 2024-01-08
Attending: STUDENT IN AN ORGANIZED HEALTH CARE EDUCATION/TRAINING PROGRAM
Payer: COMMERCIAL

## 2024-01-08 DIAGNOSIS — I82.401 ACUTE DEEP VEIN THROMBOSIS (DVT) OF RIGHT LOWER EXTREMITY, UNSPECIFIED VEIN (H): ICD-10-CM

## 2024-01-08 PROCEDURE — 93971 EXTREMITY STUDY: CPT | Mod: RT | Performed by: RADIOLOGY

## 2024-02-15 ENCOUNTER — TELEPHONE (OUTPATIENT)
Dept: RHEUMATOLOGY | Facility: CLINIC | Age: 24
End: 2024-02-15
Payer: COMMERCIAL

## 2024-04-17 NOTE — PROGRESS NOTES
RHEUMATOLOGY FOLLOW UP VISIT     Assessment & Plan     Problem List  Scoliosis  Chronic right knee synovitis s/p synovectomy and removal of loose body   +DAJUAN 1:160  DVT on Eliquis    Synovitis of knee, resolved   Comment: Right knee warmth and swelling since January with MRI results suspicious for possible pigmented villonodular synovitis versus synovial chondromatosis versus chronic synovitis.  Due to these results she underwent  synovectomy on July 25, 2023 with Dr. Diane, pathology results were negative for neoplasm and showed chronic inflammation (lymphocytes and numerous plasma cells).      Work up has been negative for RF, CCP, HLA-B27, Esr and crp now wnl, Negative quant gold, treponema abs and lyme serologies.  X-rays of the hands, wrists, SI joints, and knees from 11/2023 do not show signs of erosion joint space narrowing, bone production, or sacroiliitis.  Endorses history of intermittent knee and wrist pain since elementary school.  Has also had multiple issues with her spine felt to be secondary to scoliosis.  No history of psoriasis, inflammatory bowel disease, uveitis, or inflammatory eye disease.    Great improvement of right knee synovitis follow intra-articular steroid injection on 11/17/23 . Fluid was negative for crystals or infection; 5,800 WBCs.   Inlammatory markers are now wnl and no longer with synovitis on exam.    Currently without any tender/swollen joints,  continued morning stiffness for over an hour.    Plan:   -continue to monitor     +DAJUAN, Raynaud's, SICCA, history of monoarticular arthritis, small/medium joint arthralgias  Comment:   She was found to have a +DAJUAN 1:160 speckled, negative dsDNA, Molina, RNP, SSA, SSB, complements wnl. Does have Raynaud's, SICCA symptoms, and intermittent hand swelling. I do not currently have enough to give her a diagnosis of lupus, but will continue to monitor for this.  Given her history of a DVT (provoked) antiphospholipid antibodies were checked  and were negative.    Plan:   -Continue to monitor      History of DVT and PE  Comment: Induced in setting of arthroscopy.  Currently on Eliquis.  Factor V Leiden negative.  No previous blood clots or miscarriages.  Has a family history of lupus. APS antibodies negative   -management per PCP  -repeat ultrasound today per pt request as continues to have pain in her calf       No orders of the defined types were placed in this encounter.     Return to clinic in 5 months    The longitudinal plan of care for the diagnosis(es)/condition(s) as documented were addressed during this visit. Due to the added complexity in care, I will continue to support Muna in the subsequent management and with ongoing continuity of care.      30 minutes spent on the day of the encounter doing chart review, history and exam, counseling and documentation, not including time spent doing procedure.    Subjective     Just moved and has had increased joint pain with the move with hand pain and swelling and hips were hurting. Her knee also was giving her issues with going up and down starits. Continues to have full body mornign stiffness for about an hour. Overall joints are improving now.   Eyes continue to feel very dry. Uses rewetting drops. Wears contacts. No dry mouth. No facial swelling.     No h/o weight loss, loss of appetite, fevers, chills, rash, night sweats, swollen glands  Possible enthesitis (wrists, knees)   No malar rash, photosensitivity, recurrent mouth ulcers  No h/o persistent shortness of breath, cough, chest pain  No h/o persistent nausea, vomiting, constipation, diarrhea, abdominal pain        HPI    Muna Cobos is a 23 yr old female with a PMHx of appendicitis with perforation and abscess formation and scoliosis who presents for evaluation of right knee synovitis.    She reports that she developed right knee pain and swelling in January and was seen by [facility].  Initially she was told it was patellofemoral pain  syndrome, but when her pain and swelling continued to recur she had an MRI done of the right knee which was concerning for possible pigmented villonodular synovitis versus synovial chondromatosis versus chronic synovitis.  Due to these results she underwent  synovectomy on July 25, 2023 with Dr. Diane, pathology she results were negative for neoplasm and showed chronic inflammation (lymphocytes and numerous plasma cells).  She reports she never had any fluid drained from the knee or any steroid injections in that knee.  Her surgery was complicated by DVT and PE for which she is currently on Eliquis.  Hypercoagulability work-up with factor V Leiden was negative.  She denies any history of previous blood clots and has no history of miscarriages.  She has 1 healthy baby girl.    Although this is the first time she had subsignificant issues of her joints, she reports a history of knee pain starting back in elementary school, right more than left.  She cannot remember if she had swelling at the time as well.  Per chart review she had x-rays done of her knees in 2014 which were normal.  She also reports some intermittent wrist pain starting around the same time.  She has a long history of pain over her neck and back which is felt to be due to scoliosis.  She has never had to wear back brace or have any surgical intervention on her spine but has undergone physical therapy multiple times for this problem.  She also reports she was told she had a hairline fracture in her spine.  She reports her back pain is sometimes worse with activity and sometimes bothers her after periods of rest.  It has woken her up at night in the past.  She also had severe bilateral hip pain with her pregnancy.  More recently, has not been a major concern for her.  Currently denies pain or swelling in any other joints.  She has no history of inflammatory eye disease or inflammatory bowel disease.  She had some sort of rash as a child that she  believes was eczema or psoriasis, but currently without any skin issues or rashes.  No history of dactylitis.    Unclear if has Raynaud's.  Endorses two episodes of her middle finger turning blue and painful that self resolved.  She reports she sometimes has pain in her hands. No oral or nasal ulcers, no GERD, no skin tighting, no blood in urine or stool, no dyspnea, no cough, no chest pain, no fevers or weigh loss,  no dry eyes or dry mouth, no numbness or tingling, no muscle pain.     Mother had rheumatoid arthritis- passed age 39- accident.  Father has gout. Alcoholism.   Paternal grandmother has lupus.       Lab and Imaging review:    I reviewed recent labs and imaging including:    Labs 11/03/23  Esr and crp wnl,   Negative quant gold, treponema abs, lyme  Negative HLAB27, APS antibodies, RF, CCP,  +DAJUAN 1:160 speckled    Labs 10/2023  CBC wnl, crp wnl (down from 20), esr wnl (down from 26),     Pathology report 7/25/2023    Chronic inflammation is constituted by lymphocytes and numerous plasma cells. The presence of the latter raises the possibility of  an immune mediated condition such as rheumatoid arthritis. Please correlate with clinical and serological information    Final Diagnosis  A. Right knee, posterior synovium, excision:  - Chronic hyperplastic synovitis  - Negative for acute inflammation and neoplasm  B. Right knee, anterior synovium, excision:  - Chronic hyperplastic synovitis  - Negative for acute inflammation and neoplasm    MRI right knee 7/2023  IMPRESSION:   1. Large joint effusion with diffuse chronic synovitis or nodular synovitis.   Synovial chondromatosis or PVNS is a possibility. Inflammatory arthritis is a   possibility. Similar less pronounced involvement of the proximal tibiofibular   joint.   2. No internal derangement.       Current Outpatient Medications:     acetaminophen (TYLENOL) 325 MG tablet, Take 2 tablets (650 mg) by mouth every 4 hours as needed for mild pain, Disp: 50  tablet, Rfl: 0    amoxicillin (AMOXIL) 500 MG capsule, Take 500 mg by mouth, Disp: , Rfl:     diclofenac (VOLTAREN) 1 % topical gel, Apply 4 g topically 4 times daily, Disp: 350 g, Rfl: 1    naproxen (NAPROSYN) 500 MG tablet, Take 1 tablet (500 mg) by mouth 2 times daily as needed for moderate pain (for knee pain, with meals), Disp: 30 tablet, Rfl: 3    amoxicillin-clavulanate (AUGMENTIN) 875-125 MG per tablet, Take 1 tablet by mouth 2 times daily (Patient not taking: Reported on 7/29/2023), Disp: 20 tablet, Rfl: 0    ELIQUIS ANTICOAGULANT 5 MG tablet, Take 5 mg by mouth 2 times daily (Patient not taking: Reported on 4/19/2024), Disp: , Rfl:     oxyCODONE (ROXICODONE) 5 MG tablet, Take 1-2 tablets (5-10 mg) by mouth every 4 hours as needed for moderate to severe pain (Patient not taking: Reported on 10/11/2023), Disp: 20 tablet, Rfl: 0    Current Facility-Administered Medications:     lidocaine (PF) (XYLOCAINE) 1 % injection 1 mL, 1 mL, INTRA-ARTICULAR, Once, Eloise Mcgarry MD    methylPREDNISolone (DEPO-Medrol) injection 60 mg, 60 mg, INTRA-ARTICULAR, Once, Eloise Mcgarry MD  Allergies:  No Known Allergies  Medical Hx:  No past medical history on file.  Surgical Hx:  Past Surgical History:   Procedure Laterality Date    ARTHROSCOPY KNEE Right 7/25/2023    Procedure: Right anterior knee arthroscopic;  Surgeon: Pj Xiong MD;  [facility]    LAPAROSCOPIC APPENDECTOMY N/A 5/8/2016    Procedure: LAPAROSCOPIC APPENDECTOMY;  Surgeon: Jesus Joiner MD;  [facility]    SYNOVECTOMY KNEE Right 7/25/2023    Procedure: synovectomy;  Surgeon: Pj Xiong MD;  [facility]    SYNOVECTOMY KNEE POSTERIOR Right 7/25/2023    Procedure: Right Knee Open Posterior Synovectomy;  Surgeon: Alexis Diane MD;  [facility]     Family Hx:  No family history on file.  Social Hx:  Social History     Tobacco Use    Smoking status: Never     Smokeless tobacco: Never   Vaping Use    Vaping status: Former   Substance Use Topics    Drug use: Not Currently        Objective   Physical Exam   /81 (BP Location: Right arm, Patient Position: Sitting, Cuff Size: Adult Regular)   Pulse 94   Wt 77.1 kg (170 lb)   SpO2 97%   BMI 28.29 kg/m    General: alert, well appearing, no distress  HEENT:  clear conjunctiva, no facial rash  Cardiac: Warm and well-perfused  Pulm: Breathing without difficulty on room air  MSK: Hands without swelling or focal synovitis of the MCP, PIP or DIP joints or wrists     Right knee without warmth or synovitis, no TTP, full nonpainful ROM, left knee no synovitis and full ROM. Bilateral hips with full, nonpainful ROM,   Skin: No rashes, warm and well-perfused.        Eloise Mcgarry MD  Rheumatology

## 2024-04-19 ENCOUNTER — OFFICE VISIT (OUTPATIENT)
Dept: RHEUMATOLOGY | Facility: CLINIC | Age: 24
End: 2024-04-19
Attending: STUDENT IN AN ORGANIZED HEALTH CARE EDUCATION/TRAINING PROGRAM
Payer: COMMERCIAL

## 2024-04-19 VITALS
WEIGHT: 170 LBS | BODY MASS INDEX: 28.29 KG/M2 | SYSTOLIC BLOOD PRESSURE: 122 MMHG | HEART RATE: 94 BPM | OXYGEN SATURATION: 97 % | DIASTOLIC BLOOD PRESSURE: 81 MMHG

## 2024-04-19 DIAGNOSIS — M65.969 SYNOVITIS OF KNEE: Primary | ICD-10-CM

## 2024-04-19 DIAGNOSIS — M25.50 ARTHRALGIA, UNSPECIFIED JOINT: ICD-10-CM

## 2024-04-19 PROCEDURE — G0463 HOSPITAL OUTPT CLINIC VISIT: HCPCS | Performed by: STUDENT IN AN ORGANIZED HEALTH CARE EDUCATION/TRAINING PROGRAM

## 2024-04-19 PROCEDURE — G2211 COMPLEX E/M VISIT ADD ON: HCPCS | Performed by: STUDENT IN AN ORGANIZED HEALTH CARE EDUCATION/TRAINING PROGRAM

## 2024-04-19 PROCEDURE — 99214 OFFICE O/P EST MOD 30 MIN: CPT | Performed by: STUDENT IN AN ORGANIZED HEALTH CARE EDUCATION/TRAINING PROGRAM

## 2024-04-19 RX ORDER — AMOXICILLIN 500 MG/1
500 CAPSULE ORAL
COMMUNITY
Start: 2024-04-12 | End: 2024-04-22

## 2024-04-19 ASSESSMENT — PAIN SCALES - GENERAL: PAINLEVEL: NO PAIN (1)

## 2024-04-19 NOTE — LETTER
4/19/2024       RE: Muna Cobos  [address]  [facility] [place] [address]     Dear Colleague,    Thank you for referring your patient, Muna Cobos, to the [facility] RHEUMATOLOGY at [facility]. Please see a copy of my visit note below.     RHEUMATOLOGY FOLLOW UP VISIT     Assessment & Plan    Problem List  Scoliosis  Chronic right knee synovitis s/p synovectomy and removal of loose body   +DAJUAN 1:160  DVT on Eliquis    Synovitis of knee, resolved   Comment: Right knee warmth and swelling since January with MRI results suspicious for possible pigmented villonodular synovitis versus synovial chondromatosis versus chronic synovitis.  Due to these results she underwent  synovectomy on July 25, 2023 with Dr. Diane, pathology results were negative for neoplasm and showed chronic inflammation (lymphocytes and numerous plasma cells).      Work up has been negative for RF, CCP, HLA-B27, Esr and crp now wnl, Negative quant gold, treponema abs and lyme serologies.  X-rays of the hands, wrists, SI joints, and knees from 11/2023 do not show signs of erosion joint space narrowing, bone production, or sacroiliitis.  Endorses history of intermittent knee and wrist pain since elementary school.  Has also had multiple issues with her spine felt to be secondary to scoliosis.  No history of psoriasis, inflammatory bowel disease, uveitis, or inflammatory eye disease.    Great improvement of right knee synovitis follow intra-articular steroid injection on 11/17/23 . Fluid was negative for crystals or infection; 5,800 WBCs.   Inlammatory markers are now wnl and no longer with synovitis on exam.    Currently without any tender/swollen joints,  continued morning stiffness for over an hour.    Plan:   -continue to monitor     +DAJUAN, Raynaud's, SICCA, history of monoarticular arthritis, small/medium joint arthralgias  Comment:   She was found to have a +DAJUAN 1:160 speckled, negative dsDNA,  Smith, RNP, SSA, SSB, complements wnl. Does have Raynaud's, SICCA symptoms, and intermittent hand swelling. I do not currently have enough to give her a diagnosis of lupus, but will continue to monitor for this.  Given her history of a DVT (provoked) antiphospholipid antibodies were checked and were negative.    Plan:   -Continue to monitor      History of DVT and PE  Comment: Induced in setting of arthroscopy.  Currently on Eliquis.  Factor V Leiden negative.  No previous blood clots or miscarriages.  Has a family history of lupus. APS antibodies negative   -management per PCP  -repeat ultrasound today per pt request as continues to have pain in her calf       No orders of the defined types were placed in this encounter.     Return to clinic in 5 months    The longitudinal plan of care for the diagnosis(es)/condition(s) as documented were addressed during this visit. Due to the added complexity in care, I will continue to support Muna in the subsequent management and with ongoing continuity of care.      30 minutes spent on the day of the encounter doing chart review, history and exam, counseling and documentation, not including time spent doing procedure.    Subjective    Just moved and has had increased joint pain with the move with hand pain and swelling and hips were hurting. Her knee also was giving her issues with going up and down starits. Continues to have full body mornign stiffness for about an hour. Overall joints are improving now.   Eyes continue to feel very dry. Uses rewetting drops. Wears contacts. No dry mouth. No facial swelling.     No h/o weight loss, loss of appetite, fevers, chills, rash, night sweats, swollen glands  Possible enthesitis (wrists, knees)   No malar rash, photosensitivity, recurrent mouth ulcers  No h/o persistent shortness of breath, cough, chest pain  No h/o persistent nausea, vomiting, constipation, diarrhea, abdominal pain        HPI    Muna Cobos is a 23 yr old female with a  PMHx of appendicitis with perforation and abscess formation and scoliosis who presents for evaluation of right knee synovitis.    She reports that she developed right knee pain and swelling in January and was seen by [facility].  Initially she was told it was patellofemoral pain syndrome, but when her pain and swelling continued to recur she had an MRI done of the right knee which was concerning for possible pigmented villonodular synovitis versus synovial chondromatosis versus chronic synovitis.  Due to these results she underwent  synovectomy on July 25, 2023 with Dr. Diane, pathology she results were negative for neoplasm and showed chronic inflammation (lymphocytes and numerous plasma cells).  She reports she never had any fluid drained from the knee or any steroid injections in that knee.  Her surgery was complicated by DVT and PE for which she is currently on Eliquis.  Hypercoagulability work-up with factor V Leiden was negative.  She denies any history of previous blood clots and has no history of miscarriages.  She has 1 healthy baby girl.    Although this is the first time she had subsignificant issues of her joints, she reports a history of knee pain starting back in elementary school, right more than left.  She cannot remember if she had swelling at the time as well.  Per chart review she had x-rays done of her knees in 2014 which were normal.  She also reports some intermittent wrist pain starting around the same time.  She has a long history of pain over her neck and back which is felt to be due to scoliosis.  She has never had to wear back brace or have any surgical intervention on her spine but has undergone physical therapy multiple times for this problem.  She also reports she was told she had a hairline fracture in her spine.  She reports her back pain is sometimes worse with activity and sometimes bothers her after periods of rest.  It has woken her up at night in the past.  She also  had severe bilateral hip pain with her pregnancy.  More recently, has not been a major concern for her.  Currently denies pain or swelling in any other joints.  She has no history of inflammatory eye disease or inflammatory bowel disease.  She had some sort of rash as a child that she believes was eczema or psoriasis, but currently without any skin issues or rashes.  No history of dactylitis.    Unclear if has Raynaud's.  Endorses two episodes of her middle finger turning blue and painful that self resolved.  She reports she sometimes has pain in her hands. No oral or nasal ulcers, no GERD, no skin tighting, no blood in urine or stool, no dyspnea, no cough, no chest pain, no fevers or weigh loss,  no dry eyes or dry mouth, no numbness or tingling, no muscle pain.     Mother had rheumatoid arthritis- passed age 39- accident.  Father has gout. Alcoholism.   Paternal grandmother has lupus.       Lab and Imaging review:    I reviewed recent labs and imaging including:    Labs 11/03/23  Esr and crp wnl,   Negative quant gold, treponema abs, lyme  Negative HLAB27, APS antibodies, RF, CCP,  +DAJUAN 1:160 speckled    Labs 10/2023  CBC wnl, crp wnl (down from 20), esr wnl (down from 26),     Pathology report 7/25/2023    Chronic inflammation is constituted by lymphocytes and numerous plasma cells. The presence of the latter raises the possibility of  an immune mediated condition such as rheumatoid arthritis. Please correlate with clinical and serological information    Final Diagnosis  A. Right knee, posterior synovium, excision:  - Chronic hyperplastic synovitis  - Negative for acute inflammation and neoplasm  B. Right knee, anterior synovium, excision:  - Chronic hyperplastic synovitis  - Negative for acute inflammation and neoplasm    MRI right knee 7/2023  IMPRESSION:   1. Large joint effusion with diffuse chronic synovitis or nodular synovitis.   Synovial chondromatosis or PVNS is a possibility. Inflammatory arthritis is a    possibility. Similar less pronounced involvement of the proximal tibiofibular   joint.   2. No internal derangement.       Current Outpatient Medications:     acetaminophen (TYLENOL) 325 MG tablet, Take 2 tablets (650 mg) by mouth every 4 hours as needed for mild pain, Disp: 50 tablet, Rfl: 0    amoxicillin (AMOXIL) 500 MG capsule, Take 500 mg by mouth, Disp: , Rfl:     diclofenac (VOLTAREN) 1 % topical gel, Apply 4 g topically 4 times daily, Disp: 350 g, Rfl: 1    naproxen (NAPROSYN) 500 MG tablet, Take 1 tablet (500 mg) by mouth 2 times daily as needed for moderate pain (for knee pain, with meals), Disp: 30 tablet, Rfl: 3    amoxicillin-clavulanate (AUGMENTIN) 875-125 MG per tablet, Take 1 tablet by mouth 2 times daily (Patient not taking: Reported on 7/29/2023), Disp: 20 tablet, Rfl: 0    ELIQUIS ANTICOAGULANT 5 MG tablet, Take 5 mg by mouth 2 times daily (Patient not taking: Reported on 4/19/2024), Disp: , Rfl:     oxyCODONE (ROXICODONE) 5 MG tablet, Take 1-2 tablets (5-10 mg) by mouth every 4 hours as needed for moderate to severe pain (Patient not taking: Reported on 10/11/2023), Disp: 20 tablet, Rfl: 0    Current Facility-Administered Medications:     lidocaine (PF) (XYLOCAINE) 1 % injection 1 mL, 1 mL, INTRA-ARTICULAR, Once, Eloise Mcgarry MD    methylPREDNISolone (DEPO-Medrol) injection 60 mg, 60 mg, INTRA-ARTICULAR, Once, Eloise Mcgarry MD  Allergies:  No Known Allergies  Medical Hx:  No past medical history on file.  Surgical Hx:  Past Surgical History:   Procedure Laterality Date    ARTHROSCOPY KNEE Right 7/25/2023    Procedure: Right anterior knee arthroscopic;  Surgeon: Pj Xiong MD;  [facility]    LAPAROSCOPIC APPENDECTOMY N/A 5/8/2016    Procedure: LAPAROSCOPIC APPENDECTOMY;  Surgeon: Jesus Joiner MD;  [facility]    SYNOVECTOMY KNEE Right 7/25/2023    Procedure: synovectomy;  Surgeon: Pj Xiong MD;   [facility]    SYNOVECTOMY KNEE POSTERIOR Right 7/25/2023    Procedure: Right Knee Open Posterior Synovectomy;  Surgeon: Alexis Diane MD;  [facility]     Family Hx:  No family history on file.  Social Hx:  Social History     Tobacco Use    Smoking status: Never    Smokeless tobacco: Never   Vaping Use    Vaping status: Former   Substance Use Topics    Drug use: Not Currently        Objective  Physical Exam   /81 (BP Location: Right arm, Patient Position: Sitting, Cuff Size: Adult Regular)   Pulse 94   Wt 77.1 kg (170 lb)   SpO2 97%   BMI 28.29 kg/m    General: alert, well appearing, no distress  HEENT:  clear conjunctiva, no facial rash  Cardiac: Warm and well-perfused  Pulm: Breathing without difficulty on room air  MSK: Hands without swelling or focal synovitis of the MCP, PIP or DIP joints or wrists     Right knee without warmth or synovitis, no TTP, full nonpainful ROM, left knee no synovitis and full ROM. Bilateral hips with full, nonpainful ROM,   Skin: No rashes, warm and well-perfused.        Eloise Mcgarry MD  Rheumatology

## 2024-04-19 NOTE — NURSING NOTE
Chief Complaint   Patient presents with    RECHECK     /81 (BP Location: Right arm, Patient Position: Sitting, Cuff Size: Adult Regular)   Pulse 94   Wt 77.1 kg (170 lb)   SpO2 97%   BMI 28.29 kg/m    Alysa SPICER

## 2024-08-29 NOTE — PROGRESS NOTES
RHEUMATOLOGY FOLLOW UP VISIT     Assessment & Plan     Problem List  Scoliosis  Chronic right knee synovitis s/p synovectomy and removal of loose body   +DAJUAN 1:160  DVT and PE s/p Eliquis      Systemic lupus erythematosus  Comment: Diagnosed based on positive DAJUAN 1: 160, malar rash, raynaud's, inflammatory arthritis (right knee confirmed, arthralgias over hand and wrists and bilateral knees), and sicca symptoms, as well as history of DVT (provoked and with neg APS antibodies). Negative dsDNA, Molina, RNP, SSA, SSB, complements wnlGrandmother also with SLE.   Plan:   -start Plaquenil 300mg daily   -may need to add DMARD for arthritis, will re-assess after at least 3 months on plaquenil   -lupus activity labs today       Long-term Plaquenil use  Comment: Plaquenil started 8/2024, 5 mg/kg for her would be 385 mg taking 300 mg daily  Plan:  -Reviewed possible side effects of Plaquenil including ocular toxicity, GI symptoms, effects on bone marrow, cardiac and neurotoxicity.  -Discussed the need for yearly Plaquenil eye exams.  Patient will schedule an appointment this fall.      Synovitis of right knee, resolved   Comment: Right knee warmth and swelling since January with MRI results suspicious for possible pigmented villonodular synovitis versus synovial chondromatosis versus chronic synovitis.  Due to these results she underwent  synovectomy on July 25, 2023 with Dr. Diane, pathology results were negative for neoplasm and showed chronic inflammation (lymphocytes and numerous plasma cells).      Work up has been negative for RF, CCP, HLA-B27, Esr and crp now wnl, Negative quant gold, treponema abs and lyme serologies.  X-rays of the hands, wrists, SI joints, and knees from 11/2023 do not show signs of erosion joint space narrowing, bone production, or sacroiliitis.  Endorses history of intermittent knee and wrist pain since elementary school.  Has also had multiple issues with her spine felt to be secondary to  scoliosis.  No history of psoriasis, inflammatory bowel disease, uveitis, or inflammatory eye disease.    Great improvement of right knee synovitis follow intra-articular steroid injection on 11/17/23 . Fluid was negative for crystals or infection; 5,800 WBCs.   Inlammatory markers are now wnl and no longer with synovitis on exam, but with synovial hypertrophy, and reports continued intermittent swelling.     Plan:   -starting plaquenil as above for SLE, will likely need additional DMARD for arthritis. Monoarticular medium joint arthritis is not typical of SLE, but so far no other inflammatory arthritis found.       History of DVT and PE  Comment: Induced in setting of arthroscopy.  Completed  Eliquis.  Factor V Leiden negative.  No previous blood clots or miscarriages.  Has a family history of lupus. APS antibodies negative   Repeat ultrasound 1/2024 with resolution of clot but continues to have RLE swelling compared to left from thigh to calf.         Orders Placed This Encounter   Procedures    ALT    AST    Creatinine    CBC with platelets    Erythrocyte sedimentation rate auto    CRP inflammation    DNA double stranded antibodies    Complement C3    Complement C4    Protein  random urine      Return to clinic in 4 months    The longitudinal plan of care for the diagnosis(es)/condition(s) as documented were addressed during this visit. Due to the added complexity in care, I will continue to support Muna in the subsequent management and with ongoing continuity of care.    45 minutes spent on the day of the encounter doing chart review, history and exam, counseling and documentation, not including time spent doing procedure.    Subjective     Now with lower back pain worse with certain movements, in the morning and at night, when lifting  Wrists have been bothering her more, especailly right a few times a week.   Both knees intermittently give her issues and right knee with intermittently swell.   Also had malar  rash (brings pictures) and felt really cruddy July 18th which lasted a few days. No other rashes   Had an episode of left hand motteling and tingling and numbness which lasted a few hours July 21st . Brings pictures on her phone with this.   +Increased hair loss, had to call a  recently because hair clogged the drain   No Fevers  No oral or nasal ulcers   Stable SICCA, uses eye drops some days   Has now moved to [place] an hour away       HPI    Muna Cobos is a 23 yr old female with a PMHx of appendicitis with perforation and abscess formation and scoliosis who presents for evaluation of right knee synovitis.    She reports that she developed right knee pain and swelling in January and was seen by [facility].  Initially she was told it was patellofemoral pain syndrome, but when her pain and swelling continued to recur she had an MRI done of the right knee which was concerning for possible pigmented villonodular synovitis versus synovial chondromatosis versus chronic synovitis.  Due to these results she underwent  synovectomy on July 25, 2023 with Dr. Diane, pathology she results were negative for neoplasm and showed chronic inflammation (lymphocytes and numerous plasma cells).  She reports she never had any fluid drained from the knee or any steroid injections in that knee.  Her surgery was complicated by DVT and PE for which she is currently on Eliquis.  Hypercoagulability work-up with factor V Leiden was negative.  She denies any history of previous blood clots and has no history of miscarriages.  She has 1 healthy baby girl.    Although this is the first time she had subsignificant issues of her joints, she reports a history of knee pain starting back in elementary school, right more than left.  She cannot remember if she had swelling at the time as well.  Per chart review she had x-rays done of her knees in 2014 which were normal.  She also reports some intermittent wrist pain  starting around the same time.  She has a long history of pain over her neck and back which is felt to be due to scoliosis.  She has never had to wear back brace or have any surgical intervention on her spine but has undergone physical therapy multiple times for this problem.  She also reports she was told she had a hairline fracture in her spine.  She reports her back pain is sometimes worse with activity and sometimes bothers her after periods of rest.  It has woken her up at night in the past.  She also had severe bilateral hip pain with her pregnancy.  More recently, has not been a major concern for her.  Currently denies pain or swelling in any other joints.  She has no history of inflammatory eye disease or inflammatory bowel disease.  She had some sort of rash as a child that she believes was eczema or psoriasis, but currently without any skin issues or rashes.  No history of dactylitis.    Unclear if has Raynaud's.  Endorses two episodes of her middle finger turning blue and painful that self resolved.  She reports she sometimes has pain in her hands. No oral or nasal ulcers, no GERD, no skin tighting, no blood in urine or stool, no dyspnea, no cough, no chest pain, no fevers or weigh loss,  no dry eyes or dry mouth, no numbness or tingling, no muscle pain.     Mother had rheumatoid arthritis- passed age 39- accident.  Father has gout. Alcoholism.   Paternal grandmother has lupus.       Lab and Imaging review:    I reviewed recent labs and imaging including:    Labs 11/03/23  Esr and crp wnl,   Negative quant gold, treponema abs, lyme  Negative HLAB27, APS antibodies, RF, CCP,  +DAJUAN 1:160 speckled    Labs 10/2023  CBC wnl, crp wnl (down from 20), esr wnl (down from 26),     Pathology report 7/25/2023    Chronic inflammation is constituted by lymphocytes and numerous plasma cells. The presence of the latter raises the possibility of  an immune mediated condition such as rheumatoid arthritis. Please correlate  with clinical and serological information    Final Diagnosis  A. Right knee, posterior synovium, excision:  - Chronic hyperplastic synovitis  - Negative for acute inflammation and neoplasm  B. Right knee, anterior synovium, excision:  - Chronic hyperplastic synovitis  - Negative for acute inflammation and neoplasm    MRI right knee 7/2023  IMPRESSION:   1. Large joint effusion with diffuse chronic synovitis or nodular synovitis.   Synovial chondromatosis or PVNS is a possibility. Inflammatory arthritis is a   possibility. Similar less pronounced involvement of the proximal tibiofibular   joint.   2. No internal derangement.       Current Outpatient Medications:     acetaminophen (TYLENOL) 325 MG tablet, Take 2 tablets (650 mg) by mouth every 4 hours as needed for mild pain, Disp: 50 tablet, Rfl: 0    hydroxychloroquine 300 MG TABS, Take 300 mg by mouth daily., Disp: 90 tablet, Rfl: 1    naproxen (NAPROSYN) 500 MG tablet, Take 1 tablet (500 mg) by mouth 2 times daily as needed for moderate pain (for knee pain, with meals), Disp: 30 tablet, Rfl: 3    amoxicillin-clavulanate (AUGMENTIN) 875-125 MG per tablet, Take 1 tablet by mouth 2 times daily (Patient not taking: Reported on 7/29/2023), Disp: 20 tablet, Rfl: 0    diclofenac (VOLTAREN) 1 % topical gel, Apply 4 g topically 4 times daily (Patient not taking: Reported on 8/30/2024), Disp: 350 g, Rfl: 1    ELIQUIS ANTICOAGULANT 5 MG tablet, Take 5 mg by mouth 2 times daily (Patient not taking: Reported on 4/19/2024), Disp: , Rfl:     oxyCODONE (ROXICODONE) 5 MG tablet, Take 1-2 tablets (5-10 mg) by mouth every 4 hours as needed for moderate to severe pain (Patient not taking: Reported on 10/11/2023), Disp: 20 tablet, Rfl: 0    Current Facility-Administered Medications:     lidocaine (PF) (XYLOCAINE) 1 % injection 1 mL, 1 mL, INTRA-ARTICULAR, Once, Eloise Mcgarry MD    methylPREDNISolone (DEPO-Medrol) injection 60 mg, 60 mg, INTRA-ARTICULAR, Once,  Eloise Mcgarry MD  Allergies:  No Known Allergies  Medical Hx:  No past medical history on file.  Surgical Hx:  Past Surgical History:   Procedure Laterality Date    ARTHROSCOPY KNEE Right 7/25/2023    Procedure: Right anterior knee arthroscopic;  Surgeon: Pj Xiong MD;  Location: [facility]    LAPAROSCOPIC APPENDECTOMY N/A 5/8/2016    Procedure: LAPAROSCOPIC APPENDECTOMY;  Surgeon: Jesus Joiner MD;  Location: [facility]    SYNOVECTOMY KNEE Right 7/25/2023    Procedure: synovectomy;  Surgeon: Pj Xiong MD;  Location: [facility]    SYNOVECTOMY KNEE POSTERIOR Right 7/25/2023    Procedure: Right Knee Open Posterior Synovectomy;  Surgeon: Alexis Diane MD;  Location: [facility]     Family Hx:  No family history on file.  Social Hx:  Social History     Tobacco Use    Smoking status: Never    Smokeless tobacco: Never   Vaping Use    Vaping status: Former   Substance Use Topics    Drug use: Not Currently        Objective   Physical Exam   /77 (BP Location: Right arm, Patient Position: Sitting, Cuff Size: Adult Regular)   Pulse 88   Wt 76.7 kg (169 lb)   SpO2 98%   BMI 28.12 kg/m    General: alert, well appearing, no distress  HEENT:  clear conjunctiva, no facial rash  Cardiac: Warm and well-perfused  Pulm: Breathing without difficulty on room air  MSK: Hands without swelling or focal synovitis of the MCP, PIP or DIP joints or wrists, endorses soreness over the right wrist,   bilateral elbows and shoulders with full range of motion and no warmth or swelling.  Right leg larger than left leg from the thigh all the way down to the calf.  No tenderness over the leg.  Right knee is larger than left knee but without warmth or obvious synovitis.  Left knee with full range of motion no tenderness palpation and no swelling or warmth.  Skin: No rashes, warm and well-perfused.        Eloise Mcgarry MD  Rheumatology

## 2024-08-30 ENCOUNTER — TELEPHONE (OUTPATIENT)
Dept: RHEUMATOLOGY | Facility: CLINIC | Age: 24
End: 2024-08-30

## 2024-08-30 ENCOUNTER — LAB (OUTPATIENT)
Dept: LAB | Facility: CLINIC | Age: 24
End: 2024-08-30
Payer: COMMERCIAL

## 2024-08-30 ENCOUNTER — OFFICE VISIT (OUTPATIENT)
Dept: RHEUMATOLOGY | Facility: CLINIC | Age: 24
End: 2024-08-30
Attending: STUDENT IN AN ORGANIZED HEALTH CARE EDUCATION/TRAINING PROGRAM
Payer: COMMERCIAL

## 2024-08-30 VITALS
WEIGHT: 169 LBS | SYSTOLIC BLOOD PRESSURE: 117 MMHG | BODY MASS INDEX: 28.12 KG/M2 | DIASTOLIC BLOOD PRESSURE: 77 MMHG | HEART RATE: 88 BPM | OXYGEN SATURATION: 98 %

## 2024-08-30 DIAGNOSIS — M32.19 SYSTEMIC LUPUS ERYTHEMATOSUS WITH OTHER ORGAN INVOLVEMENT, UNSPECIFIED SLE TYPE (H): Primary | ICD-10-CM

## 2024-08-30 DIAGNOSIS — M32.19 SYSTEMIC LUPUS ERYTHEMATOSUS WITH OTHER ORGAN INVOLVEMENT, UNSPECIFIED SLE TYPE (H): ICD-10-CM

## 2024-08-30 DIAGNOSIS — M32.8 OTHER FORMS OF SYSTEMIC LUPUS ERYTHEMATOSUS, UNSPECIFIED ORGAN INVOLVEMENT STATUS (H): Primary | ICD-10-CM

## 2024-08-30 LAB
ALBUMIN MFR UR ELPH: <6 MG/DL
ALT SERPL W P-5'-P-CCNC: 13 U/L (ref 0–50)
AST SERPL W P-5'-P-CCNC: 22 U/L (ref 0–45)
CREAT SERPL-MCNC: 0.73 MG/DL (ref 0.51–0.95)
CREAT UR-MCNC: 74.4 MG/DL
CRP SERPL-MCNC: <3 MG/L
EGFRCR SERPLBLD CKD-EPI 2021: >90 ML/MIN/1.73M2
ERYTHROCYTE [DISTWIDTH] IN BLOOD BY AUTOMATED COUNT: 13.3 % (ref 10–15)
ERYTHROCYTE [SEDIMENTATION RATE] IN BLOOD BY WESTERGREN METHOD: 12 MM/HR (ref 0–20)
HCT VFR BLD AUTO: 40.1 % (ref 35–47)
HGB BLD-MCNC: 13.1 G/DL (ref 11.7–15.7)
MCH RBC QN AUTO: 29.8 PG (ref 26.5–33)
MCHC RBC AUTO-ENTMCNC: 32.7 G/DL (ref 31.5–36.5)
MCV RBC AUTO: 91 FL (ref 78–100)
PLATELET # BLD AUTO: 349 10E3/UL (ref 150–450)
PROT/CREAT 24H UR: NORMAL MG/G{CREAT}
RBC # BLD AUTO: 4.4 10E6/UL (ref 3.8–5.2)
WBC # BLD AUTO: 7.7 10E3/UL (ref 4–11)

## 2024-08-30 PROCEDURE — 36415 COLL VENOUS BLD VENIPUNCTURE: CPT

## 2024-08-30 PROCEDURE — 86140 C-REACTIVE PROTEIN: CPT

## 2024-08-30 PROCEDURE — 84460 ALANINE AMINO (ALT) (SGPT): CPT

## 2024-08-30 PROCEDURE — 84156 ASSAY OF PROTEIN URINE: CPT

## 2024-08-30 PROCEDURE — 99215 OFFICE O/P EST HI 40 MIN: CPT | Performed by: STUDENT IN AN ORGANIZED HEALTH CARE EDUCATION/TRAINING PROGRAM

## 2024-08-30 PROCEDURE — 86225 DNA ANTIBODY NATIVE: CPT

## 2024-08-30 PROCEDURE — 82565 ASSAY OF CREATININE: CPT

## 2024-08-30 PROCEDURE — 84450 TRANSFERASE (AST) (SGOT): CPT

## 2024-08-30 PROCEDURE — G0463 HOSPITAL OUTPT CLINIC VISIT: HCPCS | Performed by: STUDENT IN AN ORGANIZED HEALTH CARE EDUCATION/TRAINING PROGRAM

## 2024-08-30 PROCEDURE — 85652 RBC SED RATE AUTOMATED: CPT

## 2024-08-30 PROCEDURE — 85027 COMPLETE CBC AUTOMATED: CPT

## 2024-08-30 PROCEDURE — G2211 COMPLEX E/M VISIT ADD ON: HCPCS | Performed by: STUDENT IN AN ORGANIZED HEALTH CARE EDUCATION/TRAINING PROGRAM

## 2024-08-30 PROCEDURE — 86160 COMPLEMENT ANTIGEN: CPT

## 2024-08-30 RX ORDER — HYDROXYCHLOROQUINE SULFATE 300 MG/1
300 TABLET ORAL DAILY
Qty: 90 TABLET | Refills: 1 | Status: SHIPPED | OUTPATIENT
Start: 2024-08-30 | End: 2024-09-06

## 2024-08-30 ASSESSMENT — PAIN SCALES - GENERAL: PAINLEVEL: NO PAIN (0)

## 2024-08-30 NOTE — PATIENT INSTRUCTIONS
Please schedule a plaquenil eye exam   Please repeat labs in one month     Please send me a PeerJ[employer] message with what lab you would like to get your labs at in one month

## 2024-08-30 NOTE — TELEPHONE ENCOUNTER
[facility] Call Center    Phone Message    May a detailed message be left on voicemail: yes     Reason for Call: Other: Pt is calling to inform provider that she was notified that a prior authorization is needed for    hydroxychloroquine 300 MG TABS.     Action Taken: Message routed to:  Other: [facility]    Travel Screening: Not Applicable     Date of Service: 8/30

## 2024-08-30 NOTE — LETTER
8/30/2024       RE: Muna Cobos  [address]  [facility] [address]     Dear Colleague,    Thank you for referring your patient, Muna Cobos, to the [facility] RHEUMATOLOGY at [facility]. Please see a copy of my visit note below.     RHEUMATOLOGY FOLLOW UP VISIT     Assessment & Plan    Problem List  Scoliosis  Chronic right knee synovitis s/p synovectomy and removal of loose body   +DAJUAN 1:160  DVT and PE s/p Eliquis      Systemic lupus erythematosus  Comment: Diagnosed based on positive DAJUAN 1: 160, malar rash, raynaud's, inflammatory arthritis (right knee confirmed, arthralgias over hand and wrists and bilateral knees), and sicca symptoms, as well as history of DVT (provoked and with neg APS antibodies). Negative dsDNA, Molina, RNP, SSA, SSB, complements wnlGrandmother also with SLE.   Plan:   -start Plaquenil 300mg daily   -may need to add DMARD for arthritis, will re-assess after at least 3 months on plaquenil   -lupus activity labs today       Long-term Plaquenil use  Comment: Plaquenil started 8/2024, 5 mg/kg for her would be 385 mg taking 300 mg daily  Plan:  -Reviewed possible side effects of Plaquenil including ocular toxicity, GI symptoms, effects on bone marrow, cardiac and neurotoxicity.  -Discussed the need for yearly Plaquenil eye exams.  Patient will schedule an appointment this fall.      Synovitis of right knee, resolved   Comment: Right knee warmth and swelling since January with MRI results suspicious for possible pigmented villonodular synovitis versus synovial chondromatosis versus chronic synovitis.  Due to these results she underwent  synovectomy on July 25, 2023 with Dr. Diane, pathology results were negative for neoplasm and showed chronic inflammation (lymphocytes and numerous plasma cells).      Work up has been negative for RF, CCP, HLA-B27, Esr and crp now wnl, Negative quant gold, treponema abs and lyme serologies.  X-rays of  the hands, wrists, SI joints, and knees from 11/2023 do not show signs of erosion joint space narrowing, bone production, or sacroiliitis.  Endorses history of intermittent knee and wrist pain since elementary school.  Has also had multiple issues with her spine felt to be secondary to scoliosis.  No history of psoriasis, inflammatory bowel disease, uveitis, or inflammatory eye disease.    Great improvement of right knee synovitis follow intra-articular steroid injection on 11/17/23 . Fluid was negative for crystals or infection; 5,800 WBCs.   Inlammatory markers are now wnl and no longer with synovitis on exam, but with synovial hypertrophy, and reports continued intermittent swelling.     Plan:   -starting plaquenil as above for SLE, will likely need additional DMARD for arthritis. Monoarticular medium joint arthritis is not typical of SLE, but so far no other inflammatory arthritis found.       History of DVT and PE  Comment: Induced in setting of arthroscopy.  Completed  Eliquis.  Factor V Leiden negative.  No previous blood clots or miscarriages.  Has a family history of lupus. APS antibodies negative   Repeat ultrasound 1/2024 with resolution of clot but continues to have RLE swelling compared to left from thigh to calf.         Orders Placed This Encounter   Procedures     ALT     AST     Creatinine     CBC with platelets     Erythrocyte sedimentation rate auto     CRP inflammation     DNA double stranded antibodies     Complement C3     Complement C4     Protein  random urine      Return to clinic in 4 months    The longitudinal plan of care for the diagnosis(es)/condition(s) as documented were addressed during this visit. Due to the added complexity in care, I will continue to support Muna in the subsequent management and with ongoing continuity of care.    45 minutes spent on the day of the encounter doing chart review, history and exam, counseling and documentation, not including time spent doing  procedure.    Subjective    Now with lower back pain worse with certain movements, in the morning and at night, when lifting  Wrists have been bothering her more, especailly right a few times a week.   Both knees intermittently give her issues and right knee with intermittently swell.   Also had malar rash (brings pictures) and felt really cruddy July 18th which lasted a few days. No other rashes   Had an episode of left hand motteling and tingling and numbness which lasted a few hours July 21st . Brings pictures on her phone with this.   +Increased hair loss, had to call a  recently because hair clogged the drain   No Fevers  No oral or nasal ulcers   Stable SICCA, uses eye drops some days   Has now moved to [place] an hour away       HPI    Muna Cobos is a 23 yr old female with a PMHx of appendicitis with perforation and abscess formation and scoliosis who presents for evaluation of right knee synovitis.    She reports that she developed right knee pain and swelling in January and was seen by [facility].  Initially she was told it was patellofemoral pain syndrome, but when her pain and swelling continued to recur she had an MRI done of the right knee which was concerning for possible pigmented villonodular synovitis versus synovial chondromatosis versus chronic synovitis.  Due to these results she underwent  synovectomy on July 25, 2023 with Dr. Diane, pathology she results were negative for neoplasm and showed chronic inflammation (lymphocytes and numerous plasma cells).  She reports she never had any fluid drained from the knee or any steroid injections in that knee.  Her surgery was complicated by DVT and PE for which she is currently on Eliquis.  Hypercoagulability work-up with factor V Leiden was negative.  She denies any history of previous blood clots and has no history of miscarriages.  She has 1 healthy baby girl.    Although this is the first time she had subsignificant  issues of her joints, she reports a history of knee pain starting back in elementary school, right more than left.  She cannot remember if she had swelling at the time as well.  Per chart review she had x-rays done of her knees in 2014 which were normal.  She also reports some intermittent wrist pain starting around the same time.  She has a long history of pain over her neck and back which is felt to be due to scoliosis.  She has never had to wear back brace or have any surgical intervention on her spine but has undergone physical therapy multiple times for this problem.  She also reports she was told she had a hairline fracture in her spine.  She reports her back pain is sometimes worse with activity and sometimes bothers her after periods of rest.  It has woken her up at night in the past.  She also had severe bilateral hip pain with her pregnancy.  More recently, has not been a major concern for her.  Currently denies pain or swelling in any other joints.  She has no history of inflammatory eye disease or inflammatory bowel disease.  She had some sort of rash as a child that she believes was eczema or psoriasis, but currently without any skin issues or rashes.  No history of dactylitis.    Unclear if has Raynaud's.  Endorses two episodes of her middle finger turning blue and painful that self resolved.  She reports she sometimes has pain in her hands. No oral or nasal ulcers, no GERD, no skin tighting, no blood in urine or stool, no dyspnea, no cough, no chest pain, no fevers or weigh loss,  no dry eyes or dry mouth, no numbness or tingling, no muscle pain.     Mother had rheumatoid arthritis- passed age 39- accident.  Father has gout. Alcoholism.   Paternal grandmother has lupus.       Lab and Imaging review:    I reviewed recent labs and imaging including:    Labs 11/03/23  Esr and crp wnl,   Negative quant gold, treponema abs, lyme  Negative HLAB27, APS antibodies, RF, CCP,  +DAJUAN 1:160 speckled    Labs  10/2023  CBC wnl, crp wnl (down from 20), esr wnl (down from 26),     Pathology report 7/25/2023    Chronic inflammation is constituted by lymphocytes and numerous plasma cells. The presence of the latter raises the possibility of  an immune mediated condition such as rheumatoid arthritis. Please correlate with clinical and serological information    Final Diagnosis  A. Right knee, posterior synovium, excision:  - Chronic hyperplastic synovitis  - Negative for acute inflammation and neoplasm  B. Right knee, anterior synovium, excision:  - Chronic hyperplastic synovitis  - Negative for acute inflammation and neoplasm    MRI right knee 7/2023  IMPRESSION:   1. Large joint effusion with diffuse chronic synovitis or nodular synovitis.   Synovial chondromatosis or PVNS is a possibility. Inflammatory arthritis is a   possibility. Similar less pronounced involvement of the proximal tibiofibular   joint.   2. No internal derangement.       Current Outpatient Medications:      acetaminophen (TYLENOL) 325 MG tablet, Take 2 tablets (650 mg) by mouth every 4 hours as needed for mild pain, Disp: 50 tablet, Rfl: 0     hydroxychloroquine 300 MG TABS, Take 300 mg by mouth daily., Disp: 90 tablet, Rfl: 1     naproxen (NAPROSYN) 500 MG tablet, Take 1 tablet (500 mg) by mouth 2 times daily as needed for moderate pain (for knee pain, with meals), Disp: 30 tablet, Rfl: 3     amoxicillin-clavulanate (AUGMENTIN) 875-125 MG per tablet, Take 1 tablet by mouth 2 times daily (Patient not taking: Reported on 7/29/2023), Disp: 20 tablet, Rfl: 0     diclofenac (VOLTAREN) 1 % topical gel, Apply 4 g topically 4 times daily (Patient not taking: Reported on 8/30/2024), Disp: 350 g, Rfl: 1     ELIQUIS ANTICOAGULANT 5 MG tablet, Take 5 mg by mouth 2 times daily (Patient not taking: Reported on 4/19/2024), Disp: , Rfl:      oxyCODONE (ROXICODONE) 5 MG tablet, Take 1-2 tablets (5-10 mg) by mouth every 4 hours as needed for moderate to severe pain  (Patient not taking: Reported on 10/11/2023), Disp: 20 tablet, Rfl: 0    Current Facility-Administered Medications:      lidocaine (PF) (XYLOCAINE) 1 % injection 1 mL, 1 mL, INTRA-ARTICULAR, Once, Eloise Mcgarry MD     methylPREDNISolone (DEPO-Medrol) injection 60 mg, 60 mg, INTRA-ARTICULAR, Once, Eloise Mcgarry MD  Allergies:  No Known Allergies  Medical Hx:  No past medical history on file.  Surgical Hx:  Past Surgical History:   Procedure Laterality Date     ARTHROSCOPY KNEE Right 7/25/2023    Procedure: Right anterior knee arthroscopic;  Surgeon: Pj Xiong MD;  Location: [facility] OR     LAPAROSCOPIC APPENDECTOMY N/A 5/8/2016    Procedure: LAPAROSCOPIC APPENDECTOMY;  Surgeon: Jesus Joiner MD;  Location: [facility]     SYNOVECTOMY KNEE Right 7/25/2023    Procedure: synovectomy;  Surgeon: Pj Xiong MD;  Location: [facility]     SYNOVECTOMY KNEE POSTERIOR Right 7/25/2023    Procedure: Right Knee Open Posterior Synovectomy;  Surgeon: Alexis Diane MD;  Location: [facility] OR     Family Hx:  No family history on file.  Social Hx:  Social History     Tobacco Use     Smoking status: Never     Smokeless tobacco: Never   Vaping Use     Vaping status: Former   Substance Use Topics     Drug use: Not Currently        Objective  Physical Exam   /77 (BP Location: Right arm, Patient Position: Sitting, Cuff Size: Adult Regular)   Pulse 88   Wt 76.7 kg (169 lb)   SpO2 98%   BMI 28.12 kg/m    General: alert, well appearing, no distress  HEENT:  clear conjunctiva, no facial rash  Cardiac: Warm and well-perfused  Pulm: Breathing without difficulty on room air  MSK: Hands without swelling or focal synovitis of the MCP, PIP or DIP joints or wrists, endorses soreness over the right wrist,   bilateral elbows and shoulders with full range of motion and no warmth or swelling.  Right leg larger than left leg from the thigh all the way down to the calf.  No  tenderness over the leg.  Right knee is larger than left knee but without warmth or obvious synovitis.  Left knee with full range of motion no tenderness palpation and no swelling or warmth.  Skin: No rashes, warm and well-perfused.        Eloise Mcgarry MD  Rheumatology       Again, thank you for allowing me to participate in the care of your patient.      Sincerely,    Eloise Mcgarry MD

## 2024-08-30 NOTE — NURSING NOTE
Chief Complaint   Patient presents with    RECHECK     follow up - scheduled per pt       /77 (BP Location: Right arm, Patient Position: Sitting, Cuff Size: Adult Regular)   Pulse 88   Wt 76.7 kg (169 lb)   SpO2 98%   BMI 28.12 kg/m    Alysa SPICER

## 2024-09-02 LAB
C3 SERPL-MCNC: 129 MG/DL (ref 81–157)
C4 SERPL-MCNC: 24 MG/DL (ref 13–39)

## 2024-09-03 LAB — DSDNA AB SER-ACNC: 0.8 IU/ML

## 2024-09-03 NOTE — TELEPHONE ENCOUNTER
PA Initiation    Medication: HYDROXYCHLOROQUINE SULFATE 300 MG PO TABS  Insurance Company: [employer] - Phone [phone] Fax [phone]  Pharmacy Filling the Rx: [id number] - [place] - [address]  AT [facility] [address]  Filling Pharmacy Phone: [phone]  Filling Pharmacy Fax: [phone]  Start Date: 9/3/2024

## 2024-09-06 RX ORDER — HYDROXYCHLOROQUINE SULFATE 200 MG/1
TABLET, FILM COATED ORAL
Qty: 180 TABLET | Refills: 1 | Status: SHIPPED | OUTPATIENT
Start: 2024-09-06

## 2024-09-06 NOTE — TELEPHONE ENCOUNTER
PRIOR AUTHORIZATION DENIED    Medication: HYDROXYCHLOROQUINE SULFATE 300 MG PO TABS  Insurance Company: [employer] - Phone [phone] Fax [phone]  Denial Date: 9/4/2024  Denial Reason(s):       Appeal Information:       Patient Notified: NO

## 2024-10-02 ENCOUNTER — OFFICE VISIT (OUTPATIENT)
Dept: ORTHOPEDICS | Facility: CLINIC | Age: 24
End: 2024-10-02
Payer: COMMERCIAL

## 2024-10-02 DIAGNOSIS — M65.969 SYNOVITIS OF KNEE: Primary | ICD-10-CM

## 2024-10-02 PROCEDURE — 99213 OFFICE O/P EST LOW 20 MIN: CPT | Mod: GC | Performed by: ORTHOPAEDIC SURGERY

## 2024-10-02 NOTE — NURSING NOTE
Reason For Visit:   Chief Complaint   Patient presents with    RECHECK     Right knee pain and swelling        There were no vitals taken for this visit.    Pain Assessment  Patient Currently in Pain: Yes  Patient's Stated Pain Goal: 4  0-10 Pain Scale: 4  Primary Pain Location: Knee  Pain Descriptors: Pressure, Dull, Sharp    Usha Petty, VALE

## 2024-10-02 NOTE — LETTER
10/2/2024      Muna Cobos  [address]  [facility] [address]      Dear Colleague,    Thank you for referring your patient, Muna Cobos, to the [facility]. Please see a copy of my visit note below.    Orthopedic clinic note    DATE OF SURGERY: 7/25/2023  Surgery: Anterior arthroscopy with synovectomy and posterior open synovectomy  SURGEON: Dr. Diane, Dr. Xiong     Postoperative course notable for DVT and PE no longer on anticoagulation.     Subj: Muna presents today for follow-up.  She has been recently diagnosed with lupus and has been managing her knee pain with rheumatology.  She had injection into the knee with corticosteroids this summer that had moderate effect.  She feels that this pain and tightness has been increasing and is similar to prior to her last surgery and wants to follow-up to ensure that there is no oncologic concerns.    Objective:  No acute distress  Nonlabored breathing on room air  Musculoskeletal: Fullness posteriorly palpable without significant pain focused exam of the right lower extremity demonstrates well-healed surgical incisions on the anterior and posterior knee.  Her range of motion is full extension to about 120 degrees of flexion.  She is able to ambulate without an assistive device and without a limp. She is neurovascularly intact in her foot.  She is grossly neurovascularly intact distally    Imaging: No new    Assessment/plan:  Muna is a 24-year-old female who is just over 1 year s/p anterior and posterior synovectomy of the right knee.    Her recurrence of her symptoms and swelling in her right knee is likely related to her lupus.  We would defer management to the rheumatologist at this time with no oncologic concern.  The fullness she feels posteriorly is likely related to an effusion from the synovitis on the posterior capsule    We discussed that an arthroscopic synovectomy would be a potential treatment modality in the future  if she has failed all other treatments available via rheumatology as a last resort.      She will follow up with us on an as needed basis.    Patient seen and discussed with Dr. Diane.      --  Raman Corey MD  Orthopedic Surgery PGY-4      I was present with the resident during the history and exam.  I discussed the case with the resident and agree with the findings as documented in the assessment and plan.      Again, thank you for allowing me to participate in the care of your patient.        Sincerely,        Alexis Diane MD

## 2024-10-02 NOTE — PROGRESS NOTES
Orthopedic clinic note    DATE OF SURGERY: 7/25/2023  Surgery: Anterior arthroscopy with synovectomy and posterior open synovectomy  SURGEON: Dr. Diane, Dr. Xiong     Postoperative course notable for DVT and PE no longer on anticoagulation.     Subj: Muna presents today for follow-up.  She has been recently diagnosed with lupus and has been managing her knee pain with rheumatology.  She had injection into the knee with corticosteroids this summer that had moderate effect.  She feels that this pain and tightness has been increasing and is similar to prior to her last surgery and wants to follow-up to ensure that there is no oncologic concerns.    Objective:  No acute distress  Nonlabored breathing on room air  Musculoskeletal: Fullness posteriorly palpable without significant pain focused exam of the right lower extremity demonstrates well-healed surgical incisions on the anterior and posterior knee.  Her range of motion is full extension to about 120 degrees of flexion.  She is able to ambulate without an assistive device and without a limp. She is neurovascularly intact in her foot.  She is grossly neurovascularly intact distally    Imaging: No new    Assessment/plan:  Muna is a 24-year-old female who is just over 1 year s/p anterior and posterior synovectomy of the right knee.    Her recurrence of her symptoms and swelling in her right knee is likely related to her lupus.  We would defer management to the rheumatologist at this time with no oncologic concern.  The fullness she feels posteriorly is likely related to an effusion from the synovitis on the posterior capsule    We discussed that an arthroscopic synovectomy would be a potential treatment modality in the future if she has failed all other treatments available via rheumatology as a last resort.      She will follow up with us on an as needed basis.    Patient seen and discussed with Dr. Diane.      --  Raman Corey MD  Orthopedic Surgery  PGY-4

## 2024-11-18 ENCOUNTER — MYC MEDICAL ADVICE (OUTPATIENT)
Dept: RHEUMATOLOGY | Facility: CLINIC | Age: 24
End: 2024-11-18
Payer: COMMERCIAL

## 2024-11-18 NOTE — TELEPHONE ENCOUNTER
"Call to patient to discuss knee swelling further. Patient reports increase in swelling and pain in right knee. She reports pain is in the front and back and complains of large lump in back of knee. She reports warmth to the areas but denies redness. She states sometimes the pain is constant but it is the worst with movement, particularly squatting.     Patient reports she recently saw orthopedics who performed synovectomy 7/25/23 to follow up on pain. She states they believe swelling likely related to lupus. Per 10/2/24 ortho note \"We discussed that an arthroscopic synovectomy would be a potential treatment modality in the future if she has failed all other treatments available via rheumatology as a last resort.\" She went to her PCP who said they could order imaging but they wanted to defer to get rheumatology's opinion first. Patient has next appointment on 12/20/24. Message routed to provider to advise.     Erinn Manriquez RN    "

## 2024-11-20 DIAGNOSIS — M79.89 RIGHT LEG SWELLING: Primary | ICD-10-CM

## 2024-11-21 ENCOUNTER — OFFICE VISIT (OUTPATIENT)
Dept: RHEUMATOLOGY | Facility: CLINIC | Age: 24
End: 2024-11-21
Payer: COMMERCIAL

## 2024-11-21 ENCOUNTER — LAB (OUTPATIENT)
Dept: LAB | Facility: CLINIC | Age: 24
End: 2024-11-21
Payer: COMMERCIAL

## 2024-11-21 VITALS
BODY MASS INDEX: 27.09 KG/M2 | HEART RATE: 94 BPM | WEIGHT: 162.8 LBS | DIASTOLIC BLOOD PRESSURE: 70 MMHG | SYSTOLIC BLOOD PRESSURE: 110 MMHG

## 2024-11-21 DIAGNOSIS — M32.19 SYSTEMIC LUPUS ERYTHEMATOSUS WITH OTHER ORGAN INVOLVEMENT, UNSPECIFIED SLE TYPE (H): ICD-10-CM

## 2024-11-21 DIAGNOSIS — M65.969 SYNOVITIS OF KNEE: ICD-10-CM

## 2024-11-21 DIAGNOSIS — M65.969 SYNOVITIS OF KNEE: Primary | ICD-10-CM

## 2024-11-21 LAB
ALT SERPL W P-5'-P-CCNC: 15 U/L (ref 0–50)
AST SERPL W P-5'-P-CCNC: 21 U/L (ref 0–45)
CREAT SERPL-MCNC: 0.74 MG/DL (ref 0.51–0.95)
CRP SERPL-MCNC: <3 MG/L
EGFRCR SERPLBLD CKD-EPI 2021: >90 ML/MIN/1.73M2
ERYTHROCYTE [DISTWIDTH] IN BLOOD BY AUTOMATED COUNT: 13.1 % (ref 10–15)
ERYTHROCYTE [SEDIMENTATION RATE] IN BLOOD BY WESTERGREN METHOD: 16 MM/HR (ref 0–20)
HBV SURFACE AG SERPL QL IA: NONREACTIVE
HCT VFR BLD AUTO: 37.5 % (ref 35–47)
HCV AB SERPL QL IA: NONREACTIVE
HGB BLD-MCNC: 12.6 G/DL (ref 11.7–15.7)
MCH RBC QN AUTO: 29.6 PG (ref 26.5–33)
MCHC RBC AUTO-ENTMCNC: 33.6 G/DL (ref 31.5–36.5)
MCV RBC AUTO: 88 FL (ref 78–100)
PLATELET # BLD AUTO: 406 10E3/UL (ref 150–450)
RBC # BLD AUTO: 4.25 10E6/UL (ref 3.8–5.2)
WBC # BLD AUTO: 9.2 10E3/UL (ref 4–11)

## 2024-11-21 RX ORDER — METHYLPREDNISOLONE ACETATE 80 MG/ML
60 INJECTION, SUSPENSION INTRA-ARTICULAR; INTRALESIONAL; INTRAMUSCULAR; SOFT TISSUE ONCE
Status: COMPLETED | OUTPATIENT
Start: 2024-11-21 | End: 2024-11-21

## 2024-11-21 RX ORDER — LIDOCAINE HYDROCHLORIDE 10 MG/ML
3 INJECTION, SOLUTION EPIDURAL; INFILTRATION; INTRACAUDAL; PERINEURAL ONCE
Status: COMPLETED | OUTPATIENT
Start: 2024-11-21 | End: 2024-11-21

## 2024-11-21 RX ADMIN — LIDOCAINE HYDROCHLORIDE 3 ML: 10 INJECTION, SOLUTION EPIDURAL; INFILTRATION; INTRACAUDAL; PERINEURAL at 13:42

## 2024-11-21 RX ADMIN — METHYLPREDNISOLONE ACETATE 60 MG: 80 INJECTION, SUSPENSION INTRA-ARTICULAR; INTRALESIONAL; INTRAMUSCULAR; SOFT TISSUE at 13:44

## 2024-11-21 NOTE — PROGRESS NOTES
RHEUMATOLOGY FOLLOW UP VISIT     Assessment & Plan     Problem List  Scoliosis  Chronic right knee synovitis s/p synovectomy and removal of loose body   +DAJUAN 1:160  DVT and PE s/p Eliquis      Incomplete Systemic lupus erythematosus  Comment: Diagnosed based on positive DAJUAN 1: 160, malar rash, raynaud's, inflammatory arthritis (right knee confirmed, arthralgias over hand and wrists and bilateral knees), and sicca symptoms, as well as history of DVT (provoked and with neg APS antibodies). Negative dsDNA, Molina, RNP, SSA, SSB, complements wnl. Grandmother also with SLE.     Has been having flares of fatigue, elevated temperature to 99, arthralgias and bodyaches, and redness over face every few weeks which may be consistent with mild lupus flares.    Plan:   -Continue Plaquenil 300mg daily   -Check inflammatory markers and double-stranded DNA today.  Requested she let me know when she is having another flare so we can get lupus activity markers during this time.  -After TPMT we will plan on starting Imuran      Long-term Plaquenil use  Comment: Plaquenil started 8/2024, 5 mg/kg for her would be 385 mg taking 300 mg daily  Plan:  -Needs yearly Plaquenil eye exams    Synovitis of right knee, recurrent  Comment: Right knee warmth and swelling starting January 2023 with MRI results suspicious for possible pigmented villonodular synovitis versus synovial chondromatosis versus chronic synovitis.  Due to these results she underwent  synovectomy on July 25, 2023 with Dr. Diane, pathology results were negative for neoplasm and showed chronic inflammation (lymphocytes and numerous plasma cells).      Work up has been negative for RF, CCP, HLA-B27, Esr and crp now wnl, Negative quant gold, treponema abs and lyme serologies.  X-rays of the hands, wrists, SI joints, and knees from 11/2023 do not show signs of erosion joint space narrowing, bone production, or sacroiliitis.  Endorses history of intermittent knee and wrist pain  since elementary school.  Has also had multiple issues with her spine felt to be secondary to scoliosis.  No history of psoriasis, inflammatory bowel disease, uveitis, or inflammatory eye disease.    Great improvement of right knee synovitis follow intra-articular steroid injection on 11/17/23 . Fluid was negative for crystals or infection; 5,800 WBCs.   Inlammatory markers returned to normal limits and synovitis resolved, but with synovial hypertrophy, and reports of continued intermittent swelling.      Most recently, had recurrence of swelling with development of popliteal cyst which appears to have now ruptured resulting in calf pain.  Mild synovitis on exam today.    Plan:   -Will plan to start Imuran following TPMT result   -Intra-articular steroid injection into right knee today in clinic 11/21/2024    High risk medication use  Comment: Plan to start Imuran  QuantiFERON gold negative 11/2023   Plan:  -Hepatitis serologies today  -TPMT today      History of DVT and PE  Comment: Induced in setting of arthroscopy.  Completed  Eliquis.  Factor V Leiden negative.  No previous blood clots or miscarriages.  Has a family history of lupus. APS antibodies negative   Repeat ultrasound 1/2024 with resolution of clot but continues to have RLE swelling compared to left from thigh to calf.   Plan:  -I do not feel that her current swelling is concerning for DVT, and can be explained by a ruptured popliteal cyst        Orders Placed This Encounter   Procedures    DRAIN/INJECT LARGE JOINT/BURSA    Thiopurine Methyltransferase RBC    Erythrocyte sedimentation rate auto    CRP inflammation      Return to clinic in 3 months    The longitudinal plan of care for the diagnosis(es)/condition(s) as documented were addressed during this visit. Due to the added complexity in care, I will continue to support Muna in the subsequent management and with ongoing continuity of care.    30 minutes spent on the day of the encounter doing chart  review, history and exam, counseling and documentation, not including time spent doing procedure.    Subjective       A couple weeks ago had flare with low grade fever to 99 and full body aches, joint pain, fatigue and overall feeling very terrible. Face was bright red. Lasted for about a week. Has had similar, shorter, less sever episodes as well about every few weeks. Also had puffy hands during this time - shows me pictures on her phone and finger pads are swollen.     About a month ago her right knee started to swell again and then two weeks ago noticed a bump behind her knee which then swelled to the size of a golf ball. This resolved but then her calf got swollen.         RENETTA Cobos is a 23 yr old female with a PMHx of appendicitis with perforation and abscess formation and scoliosis who presents for evaluation of right knee synovitis.    She reports that she developed right knee pain and swelling in January and was seen by [facility].  Initially she was told it was patellofemoral pain syndrome, but when her pain and swelling continued to recur she had an MRI done of the right knee which was concerning for possible pigmented villonodular synovitis versus synovial chondromatosis versus chronic synovitis.  Due to these results she underwent  synovectomy on July 25, 2023 with Dr. Diane, pathology she results were negative for neoplasm and showed chronic inflammation (lymphocytes and numerous plasma cells).  She reports she never had any fluid drained from the knee or any steroid injections in that knee.  Her surgery was complicated by DVT and PE for which she is currently on Eliquis.  Hypercoagulability work-up with factor V Leiden was negative.  She denies any history of previous blood clots and has no history of miscarriages.  She has 1 healthy baby girl.    Although this is the first time she had subsignificant issues of her joints, she reports a history of knee pain starting back in  elementary school, right more than left.  She cannot remember if she had swelling at the time as well.  Per chart review she had x-rays done of her knees in 2014 which were normal.  She also reports some intermittent wrist pain starting around the same time.  She has a long history of pain over her neck and back which is felt to be due to scoliosis.  She has never had to wear back brace or have any surgical intervention on her spine but has undergone physical therapy multiple times for this problem.  She also reports she was told she had a hairline fracture in her spine.  She reports her back pain is sometimes worse with activity and sometimes bothers her after periods of rest.  It has woken her up at night in the past.  She also had severe bilateral hip pain with her pregnancy.  More recently, has not been a major concern for her.  Currently denies pain or swelling in any other joints.  She has no history of inflammatory eye disease or inflammatory bowel disease.  She had some sort of rash as a child that she believes was eczema or psoriasis, but currently without any skin issues or rashes.  No history of dactylitis.    Unclear if has Raynaud's.  Endorses two episodes of her middle finger turning blue and painful that self resolved.  She reports she sometimes has pain in her hands. No oral or nasal ulcers, no GERD, no skin tighting, no blood in urine or stool, no dyspnea, no cough, no chest pain, no fevers or weigh loss,  no dry eyes or dry mouth, no numbness or tingling, no muscle pain.     Mother had rheumatoid arthritis- passed age 39- accident.  Father has gout. Alcoholism.   Paternal grandmother has lupus.       Lab and Imaging review:    I reviewed recent labs and imaging including:    Labs 11/03/23  Esr and crp wnl,   Negative quant gold, treponema abs, lyme  Negative HLAB27, APS antibodies, RF, CCP,  +DAJUAN 1:160 speckled    Labs 10/2023  CBC wnl, crp wnl (down from 20), esr wnl (down from 26),     Pathology  report 7/25/2023    Chronic inflammation is constituted by lymphocytes and numerous plasma cells. The presence of the latter raises the possibility of  an immune mediated condition such as rheumatoid arthritis. Please correlate with clinical and serological information    Final Diagnosis  A. Right knee, posterior synovium, excision:  - Chronic hyperplastic synovitis  - Negative for acute inflammation and neoplasm  B. Right knee, anterior synovium, excision:  - Chronic hyperplastic synovitis  - Negative for acute inflammation and neoplasm    MRI right knee 7/2023  IMPRESSION:   1. Large joint effusion with diffuse chronic synovitis or nodular synovitis.   Synovial chondromatosis or PVNS is a possibility. Inflammatory arthritis is a   possibility. Similar less pronounced involvement of the proximal tibiofibular   joint.   2. No internal derangement.       Current Outpatient Medications:     acetaminophen (TYLENOL) 325 MG tablet, Take 2 tablets (650 mg) by mouth every 4 hours as needed for mild pain, Disp: 50 tablet, Rfl: 0    hydroxychloroquine (PLAQUENIL) 200 MG tablet, Take one and a half tablets (300mg) daily, Disp: 180 tablet, Rfl: 1    naproxen (NAPROSYN) 500 MG tablet, Take 1 tablet (500 mg) by mouth 2 times daily as needed for moderate pain (for knee pain, with meals), Disp: 30 tablet, Rfl: 3    diclofenac (VOLTAREN) 1 % topical gel, Apply 4 g topically 4 times daily (Patient not taking: Reported on 11/21/2024), Disp: 350 g, Rfl: 1    ELIQUIS ANTICOAGULANT 5 MG tablet, Take 5 mg by mouth 2 times daily (Patient not taking: Reported on 11/21/2024), Disp: , Rfl:     Current Facility-Administered Medications:     lidocaine (PF) (XYLOCAINE) 1 % injection 1 mL, 1 mL, INTRA-ARTICULAR, Once, Eloise Mcgarry MD    lidocaine (PF) (XYLOCAINE) 1 % injection 3 mL, 3 mL, INTRA-ARTICULAR, Once,     methylPREDNISolone (DEPO-Medrol) injection 60 mg, 60 mg, Intramuscular, Once,     methylPREDNISolone  (DEPO-Medrol) injection 60 mg, 60 mg, INTRA-ARTICULAR, Once, Eloise Mcgarry MD  Allergies:  No Known Allergies  Medical Hx:  No past medical history on file.  Surgical Hx:  Past Surgical History:   Procedure Laterality Date    ARTHROSCOPY KNEE Right 7/25/2023    Procedure: Right anterior knee arthroscopic;  Surgeon: Pj Xiong MD;  Location: [facility]    LAPAROSCOPIC APPENDECTOMY N/A 5/8/2016    Procedure: LAPAROSCOPIC APPENDECTOMY;  Surgeon: Jesus Joiner MD;  [facility]: [facility]    SYNOVECTOMY KNEE Right 7/25/2023    Procedure: synovectomy;  Surgeon: Pj Xiong MD;  [facility]: [facility]    SYNOVECTOMY KNEE POSTERIOR Right 7/25/2023    Procedure: Right Knee Open Posterior Synovectomy;  Surgeon: Alexis Diane MD;  Location: [facility]     Family Hx:  No family history on file.  Social Hx:  Social History     Tobacco Use    Smoking status: Never    Smokeless tobacco: Never   Vaping Use    Vaping status: Former   Substance Use Topics    Drug use: Not Currently        Objective   Physical Exam   /70 (BP Location: Right arm)   Pulse 94   Wt 73.8 kg (162 lb 12.8 oz)   BMI 27.09 kg/m    General: alert, well appearing, no distress  HEENT:  clear conjunctiva, no mild facial erythema   Cardiac: Warm and well-perfused  Pulm: Breathing without difficulty on room air  MSK: Hands without swelling or focal synovitis of the MCP, PIP or DIP joints or wrists,  bilateral elbows and shoulders with full range of motion and no warmth or swelling.   Right knee with mild effusion, no posterior bulge, right calf edematous compared to left with some tenderness with calf squeeze.  Skin: No rashes, warm and well-perfused.     Procedure     Right knee Injection   Indication: synovitis   Risks of the procedure were reviewed with the patient which include but are not limited to risk of infection, pain, and bleeding at site of injection. Informed consent was obtained. Time out was  performed.  The site was marked and the right knee joint was prepped in sterile fashion with chlorhexidine solution. Using a/n medial approach, the right knee joint space was accessed with a 22G needle injected with a solution of 60mg of Depo-Medrol mixed with 3cc of lidocaine, using a 3cc syringe.  Amount of fluid removed: 0cc  Estimated Blood Loss: minimal  The patient tolerated the procedure well without any adverse events. They were instructed to rest, and intermittently ice the joint over the next 24 hours. They were instructed to call our office and come to the ER in case of a fever, chills, excessive pain, or bleeding at the site of injection.         Eloise Mcgarry MD  Rheumatology

## 2024-11-27 LAB — TPMT BLD-CCNC: 25.6 U/ML

## 2025-02-19 NOTE — PROGRESS NOTES
RHEUMATOLOGY FOLLOW UP VISIT     Assessment & Plan     Problem List  Scoliosis  Chronic right knee synovitis s/p synovectomy and removal of loose body   +DAJUAN 1:160  DVT and PE s/p Eliquis      Incomplete Systemic lupus erythematosus  Comment: Suspected due to positive DAJUAN 1: 160, malar rash, raynaud's, inflammatory arthritis (right knee confirmed, arthralgias over hand and wrists and bilateral knees), and sicca symptoms, as well as history of DVT (provoked and with neg APS antibodies). Negative dsDNA, Molina, RNP, SSA, SSB, complements wnl. Grandmother also with SLE.     Her flares are characterized by fatigue, elevated temperature to 99, arthralgias and bodyaches, and malar rash which used to present every few weeks, now about monthly.     Tx History:  Naproxen 500mg BID -12/2023 - 1/2024 inadequate improvement   Right knee injection 11/2023 & 11/21/24  Plaquneil 8/2024 - present   Imuran 11/2025 - present at 50mg daily, inconsistent use     Plan:   -Continue Plaquenil 300mg daily   -Resume imuran 50mg daily x 7 days then increase to 100mg daily   -Celebrex 100 mg twice daily as needed for joint pain  -It may be helpful to get lab work done with inflammatory markers and dsDNA during an acute flare, this has been difficult to coordinate in the past; inflammatory markers today  -Discussed various strategies for increasing compliance today  -Discussed the patient can reach out to me when she is having a flare so we can evaluate her current symptoms and determine if a trial of prednisone may be appropriate    High risk medication use  Comment: Imuran  QuantiFERON gold negative (11/2023)  Hepatitis B and C nonreactive (11/2024)  Plan:  -Toxicity monitoring labs today and again in 1 month    Long-term Plaquenil use  Comment: Plaquenil started 8/2024, 5 mg/kg for her would be 385 mg taking 300 mg daily  Plan:  -Needs yearly Plaquenil eye exams -counseled patient to schedule this today    Synovitis of right knee,  recurrent  Comment: Right knee warmth and swelling starting January 2023 with MRI results suspicious for possible pigmented villonodular synovitis versus synovial chondromatosis versus chronic synovitis.  Due to these results she underwent  synovectomy on July 25, 2023 with Dr. Diane, pathology results were negative for neoplasm and showed chronic inflammation (lymphocytes and numerous plasma cells).      Work up has been negative for RF, CCP, HLA-B27, Esr and crp now wnl, Negative quant gold, treponema abs and lyme serologies.  X-rays of the hands, wrists, SI joints, and knees from 11/2023 did not show signs of erosion, joint space narrowing, bone production, or sacroiliitis.  Endorses history of intermittent knee and wrist pain since elementary school.  Has also had multiple issues with her spine felt to be secondary to scoliosis.  No history of psoriasis, inflammatory bowel disease, uveitis, or inflammatory eye disease.    Great improvement of right knee synovitis following intra-articular steroid injection on 11/17/23 . Fluid was negative for crystals or infection; 5,800 WBCs.   Inlammatory markers returned to normal limits and synovitis resolved, but with synovial hypertrophy, and reports of continued intermittent swelling.  Most prominent recurrence of swelling and fall 2024 with ruptured popliteal cyst status post repeat steroid injection 11/2024.        Plan:   -Imuran as above      History of DVT and PE  Comment: Induced in setting of arthroscopy.  Completed  Eliquis.  Factor V Leiden negative.  No previous blood clots or miscarriages.  Has a family history of lupus. APS antibodies negative   Repeat ultrasound 1/2024 with resolution of clot       Orders Placed This Encounter   Procedures    Erythrocyte sedimentation rate auto    CRP inflammation    Adult Eye  Referral      Return to clinic in 3 months    The longitudinal plan of care for the diagnosis(es)/condition(s) as documented were addressed  during this visit. Due to the added complexity in care, I will continue to support Muna in the subsequent management and with ongoing continuity of care.    45 minutes spent on the day of the encounter doing chart review, history and exam, counseling and documentation, not including time spent doing procedure.    Subjective       Has had difficulty remembering her Imuran, takes one pill a day but misses about half her doses. Takes plaquenil daily also with missed doses. Feels she is having a lot of hand pain and stiffness, worse in the morning lasting a few hours. Knees still bother her occasionally depending on what she is doing. Felt she had a fever with a flare up of joint pain in the beginning of January. Had a flare of malar rash (shows me pictures on her phone) in the beginning of January. No other rashes, no recurrent oral ulcers.     Had a flare in Dec and in January of joint pain, elevated temp, fatigue.       RENETTA Cobos is a 23 yr old female with a PMHx of appendicitis with perforation and abscess formation and scoliosis who presents for evaluation of right knee synovitis.    She reports that she developed right knee pain and swelling in January and was seen by [facility].  Initially she was told it was patellofemoral pain syndrome, but when her pain and swelling continued to recur she had an MRI done of the right knee which was concerning for possible pigmented villonodular synovitis versus synovial chondromatosis versus chronic synovitis.  Due to these results she underwent  synovectomy on July 25, 2023 with Dr. Diane, pathology she results were negative for neoplasm and showed chronic inflammation (lymphocytes and numerous plasma cells).  She reports she never had any fluid drained from the knee or any steroid injections in that knee.  Her surgery was complicated by DVT and PE for which she is currently on Eliquis.  Hypercoagulability work-up with factor V Leiden was negative.  She  denies any history of previous blood clots and has no history of miscarriages.  She has 1 healthy baby girl.    Although this is the first time she had subsignificant issues of her joints, she reports a history of knee pain starting back in elementary school, right more than left.  She cannot remember if she had swelling at the time as well.  Per chart review she had x-rays done of her knees in 2014 which were normal.  She also reports some intermittent wrist pain starting around the same time.  She has a long history of pain over her neck and back which is felt to be due to scoliosis.  She has never had to wear back brace or have any surgical intervention on her spine but has undergone physical therapy multiple times for this problem.  She also reports she was told she had a hairline fracture in her spine.  She reports her back pain is sometimes worse with activity and sometimes bothers her after periods of rest.  It has woken her up at night in the past.  She also had severe bilateral hip pain with her pregnancy.  More recently, has not been a major concern for her.  Currently denies pain or swelling in any other joints.  She has no history of inflammatory eye disease or inflammatory bowel disease.  She had some sort of rash as a child that she believes was eczema or psoriasis, but currently without any skin issues or rashes.  No history of dactylitis.    Unclear if has Raynaud's.  Endorses two episodes of her middle finger turning blue and painful that self resolved.  She reports she sometimes has pain in her hands. No oral or nasal ulcers, no GERD, no skin tighting, no blood in urine or stool, no dyspnea, no cough, no chest pain, no fevers or weigh loss,  no dry eyes or dry mouth, no numbness or tingling, no muscle pain.     Mother had rheumatoid arthritis- passed age 39- accident.  Father has gout. Alcoholism.   Paternal grandmother has lupus.       Lab and Imaging review:    I reviewed recent labs and imaging  including:    Labs 11/03/23  Esr and crp wnl,   Negative quant gold, treponema abs, lyme  Negative HLAB27, APS antibodies, RF, CCP,  +DAJUAN 1:160 speckled    Labs 10/2023  CBC wnl, crp wnl (down from 20), esr wnl (down from 26),     Pathology report 7/25/2023    Chronic inflammation is constituted by lymphocytes and numerous plasma cells. The presence of the latter raises the possibility of  an immune mediated condition such as rheumatoid arthritis. Please correlate with clinical and serological information    Final Diagnosis  A. Right knee, posterior synovium, excision:  - Chronic hyperplastic synovitis  - Negative for acute inflammation and neoplasm  B. Right knee, anterior synovium, excision:  - Chronic hyperplastic synovitis  - Negative for acute inflammation and neoplasm    MRI right knee 7/2023  IMPRESSION:   1. Large joint effusion with diffuse chronic synovitis or nodular synovitis.   Synovial chondromatosis or PVNS is a possibility. Inflammatory arthritis is a   possibility. Similar less pronounced involvement of the proximal tibiofibular   joint.   2. No internal derangement.       Current Outpatient Medications:     acetaminophen (TYLENOL) 325 MG tablet, Take 2 tablets (650 mg) by mouth every 4 hours as needed for mild pain, Disp: 50 tablet, Rfl: 0    azaTHIOprine (IMURAN) 50 MG tablet, Take one pill daily for 7 days then increase to two pills daily. Labs every 8-12 weeks., Disp: 90 tablet, Rfl: 0    celecoxib (CELEBREX) 100 MG capsule, Take 1 capsule (100 mg) by mouth 2 times daily as needed for moderate pain., Disp: 90 capsule, Rfl: 1    diclofenac (VOLTAREN) 1 % topical gel, Apply 4 g topically 4 times daily, Disp: 350 g, Rfl: 1    hydroxychloroquine (PLAQUENIL) 200 MG tablet, Take one and a half tablets (300mg) daily, Disp: 180 tablet, Rfl: 1    naproxen (NAPROSYN) 500 MG tablet, Take 1 tablet (500 mg) by mouth 2 times daily as needed for moderate pain (for knee pain, with meals), Disp: 30 tablet, Rfl:  3    Current Facility-Administered Medications:     lidocaine (PF) (XYLOCAINE) 1 % injection 1 mL, 1 mL, INTRA-ARTICULAR, Once, Eloise Mcgarry MD    methylPREDNISolone (DEPO-Medrol) injection 60 mg, 60 mg, INTRA-ARTICULAR, Once, Eloise Mcgarry MD  Allergies:  No Known Allergies  Medical Hx:  No past medical history on file.  Surgical Hx:  Past Surgical History:   Procedure Laterality Date    ARTHROSCOPY KNEE Right 7/25/2023    Procedure: Right anterior knee arthroscopic;  Surgeon: Pj Xiong MD;  [facility]    LAPAROSCOPIC APPENDECTOMY N/A 5/8/2016    Procedure: LAPAROSCOPIC APPENDECTOMY;  Surgeon: Jesus Joiner MD;  [facility]    SYNOVECTOMY KNEE Right 7/25/2023    Procedure: synovectomy;  Surgeon: Pj Xiong MD;  [facility]    SYNOVECTOMY KNEE POSTERIOR Right 7/25/2023    Procedure: Right Knee Open Posterior Synovectomy;  Surgeon: Alexis Diane MD;  Location: [facility]     Family Hx:  No family history on file.  Social Hx:  Social History     Tobacco Use    Smoking status: Never    Smokeless tobacco: Never   Vaping Use    Vaping status: Former   Substance Use Topics    Drug use: Not Currently        Objective   Physical Exam   /72 (BP Location: Right arm)   Pulse 88   Wt 68.1 kg (150 lb 1.6 oz)   BMI 24.98 kg/m    General: alert, well appearing, no distress  HEENT:  clear conjunctiva, no malar rash today   Cardiac: Warm and well-perfused  Pulm: Breathing without difficulty on room air  MSK: Hands without swelling or focal synovitis of the MCP, PIP or DIP joints or wrists,  bilateral elbows and shoulders with full range of motion and no warmth or swelling.   Right knee with mild effusion, some TTP, +crepitus,  no longer with calf swelling  Skin: No rashes, warm and well-perfused.        Eloise Mcgarry MD  Rheumatology

## 2025-02-20 ENCOUNTER — LAB (OUTPATIENT)
Dept: LAB | Facility: CLINIC | Age: 25
End: 2025-02-20
Payer: COMMERCIAL

## 2025-02-20 ENCOUNTER — OFFICE VISIT (OUTPATIENT)
Dept: RHEUMATOLOGY | Facility: CLINIC | Age: 25
End: 2025-02-20
Payer: COMMERCIAL

## 2025-02-20 VITALS
BODY MASS INDEX: 24.98 KG/M2 | WEIGHT: 150.1 LBS | DIASTOLIC BLOOD PRESSURE: 72 MMHG | SYSTOLIC BLOOD PRESSURE: 106 MMHG | HEART RATE: 88 BPM

## 2025-02-20 DIAGNOSIS — M32.8 OTHER FORMS OF SYSTEMIC LUPUS ERYTHEMATOSUS, UNSPECIFIED ORGAN INVOLVEMENT STATUS (H): ICD-10-CM

## 2025-02-20 DIAGNOSIS — M65.969 SYNOVITIS OF KNEE: ICD-10-CM

## 2025-02-20 LAB
ALT SERPL W P-5'-P-CCNC: 10 U/L (ref 0–50)
AST SERPL W P-5'-P-CCNC: 20 U/L (ref 0–45)
CREAT SERPL-MCNC: 0.71 MG/DL (ref 0.51–0.95)
CRP SERPL-MCNC: 5.51 MG/L
EGFRCR SERPLBLD CKD-EPI 2021: >90 ML/MIN/1.73M2
ERYTHROCYTE [DISTWIDTH] IN BLOOD BY AUTOMATED COUNT: 14 % (ref 10–15)
ERYTHROCYTE [SEDIMENTATION RATE] IN BLOOD BY WESTERGREN METHOD: 20 MM/HR (ref 0–20)
HCT VFR BLD AUTO: 36.4 % (ref 35–47)
HGB BLD-MCNC: 12 G/DL (ref 11.7–15.7)
MCH RBC QN AUTO: 29.9 PG (ref 26.5–33)
MCHC RBC AUTO-ENTMCNC: 33 G/DL (ref 31.5–36.5)
MCV RBC AUTO: 91 FL (ref 78–100)
PLATELET # BLD AUTO: 346 10E3/UL (ref 150–450)
RBC # BLD AUTO: 4.02 10E6/UL (ref 3.8–5.2)
WBC # BLD AUTO: 6.6 10E3/UL (ref 4–11)

## 2025-02-20 RX ORDER — AZATHIOPRINE 50 MG/1
50 TABLET ORAL DAILY
Qty: 90 TABLET | Refills: 0 | Status: SHIPPED | OUTPATIENT
Start: 2025-02-20 | End: 2025-02-20

## 2025-02-20 RX ORDER — CELECOXIB 100 MG/1
100 CAPSULE ORAL 2 TIMES DAILY PRN
Qty: 90 CAPSULE | Refills: 1 | Status: SHIPPED | OUTPATIENT
Start: 2025-02-20

## 2025-02-20 RX ORDER — HYDROXYCHLOROQUINE SULFATE 200 MG/1
TABLET, FILM COATED ORAL
Qty: 180 TABLET | Refills: 1 | Status: SHIPPED | OUTPATIENT
Start: 2025-02-20

## 2025-02-20 RX ORDER — AZATHIOPRINE 50 MG/1
TABLET ORAL
Qty: 90 TABLET | Refills: 0 | Status: SHIPPED | OUTPATIENT
Start: 2025-02-20

## 2025-02-20 NOTE — PATIENT INSTRUCTIONS
Get labs today  Resume Imuran 50mg daily for 1 week then increase to 100mg daily   Keep taking your plaquenil   Repeat labs in one month   Please schedule a plaquenil exam.   You can take Celebrex 100mg once or twice a day as needed for joint pain

## (undated) DEVICE — DRAPE STOCKINETTE IMPERVIOUS 12" 1587

## (undated) DEVICE — ESU GROUND PAD ADULT W/CORD E7507

## (undated) DEVICE — ESU GROUND PAD UNIVERSAL W/O CORD

## (undated) DEVICE — SU MONOCRYL 4-0 PS-2 18" UND Y496G

## (undated) DEVICE — Device

## (undated) DEVICE — NEEDLE HYPO MONOJECT 22GA 1.15IN BLUE 8881250206

## (undated) DEVICE — LIGHT HANDLE X1 31140133

## (undated) DEVICE — DRAPE MAYO STAND 23X54 8337

## (undated) DEVICE — LINEN TOWEL PACK X5 5464

## (undated) DEVICE — ABLATOR ARTHREX APOLLO RF MP90 ASPIRATING 90DEG AR-9811

## (undated) DEVICE — COVER CAMERA IN-LIGHT DISP LT-C02

## (undated) DEVICE — POSITIONER ARMBOARD FOAM 1PAIR LF FP-ARMB1

## (undated) DEVICE — SU VICRYL 2-0 CT-1 27" UND J259H

## (undated) DEVICE — DRSG STERI STRIP 1/2X4" R1547

## (undated) DEVICE — DRAPE CONVERTORS U-DRAPE 60X72" 8476

## (undated) DEVICE — DRAPE IOBAN INCISE 23X17" 6650EZ

## (undated) DEVICE — DRAPE U-DRAPE 1015NSD NON-STERILE

## (undated) DEVICE — GLOVE BIOGEL PI ULTRATOUCH G SZ 7.0 42170

## (undated) DEVICE — SOL WATER IRRIG 1000ML BOTTLE 2F7114

## (undated) DEVICE — LINEN ORTHO PACK 5446

## (undated) DEVICE — DECANTER TRANSFER DEVICE 2008S

## (undated) DEVICE — MITT SURGICAL PREP HAIR REMOVER LATEX FREE 617142

## (undated) DEVICE — PREP CHLORAPREP 26ML TINTED HI-LITE ORANGE 930815

## (undated) DEVICE — PACK LOWER EXTREMITY RIVERSIDE SOP32LEFSX

## (undated) DEVICE — CONTAINER SPECIMEN 4OZ STERILE 17099

## (undated) DEVICE — SU ETHILON 3-0 PS-1 18" 1663H

## (undated) DEVICE — STRAP KNEE/BODY 31143004

## (undated) DEVICE — SU VICRYL 0 CT-1 27" J340H

## (undated) DEVICE — SUCTION MANIFOLD NEPTUNE 2 SYS 4 PORT 0702-020-000

## (undated) DEVICE — GLOVE BIOGEL PI SZ 8.0 40880

## (undated) DEVICE — PREP POVIDONE-IODINE 7.5% SCRUB 4OZ BOTTLE MDS093945

## (undated) DEVICE — TUBING ARTHROSCOPY PUMP ARTHREX AR-6410

## (undated) DEVICE — ESU HOLDER LAP INST DISP PURPLE LONG 330MM H-PRO-330

## (undated) DEVICE — TUBING SMOKE EVAC SUCTION SLEEVE 0703-005-065

## (undated) DEVICE — SOL NACL 0.9% IRRIG 1000ML BOTTLE 2F7124

## (undated) DEVICE — GLOVE BIOGEL PI MICRO INDICATOR UNDERGLOVE SZ 8.0 48980

## (undated) DEVICE — GOWN XLG DISP 9545

## (undated) DEVICE — SUCTION TIP YANKAUER STR K87

## (undated) DEVICE — BUR ARTHREX COOLCUT EXCALIBUR 5.0MMX13CM AR-8500EX

## (undated) DEVICE — CAST PADDING 6" STERILE 9046S

## (undated) DEVICE — SPONGE LAP 18X18" X8435

## (undated) DEVICE — BUR ARTHREX COOLCUT DISSECTOR 3.5MM  AR-8350DS

## (undated) DEVICE — SOL NACL 0.9% IRRIG 3000ML BAG 2B7477

## (undated) DEVICE — ESU PENCIL W/SMOKE EVAC NEPTUNE STRYKER 0703-046-000

## (undated) DEVICE — LINEN DRAPE 54X72" 5467

## (undated) DEVICE — SPONGE RAY-TEC 4X8" 7318

## (undated) RX ORDER — SODIUM CHLORIDE, SODIUM LACTATE, POTASSIUM CHLORIDE, CALCIUM CHLORIDE 600; 310; 30; 20 MG/100ML; MG/100ML; MG/100ML; MG/100ML
INJECTION, SOLUTION INTRAVENOUS
Status: DISPENSED
Start: 2023-07-25

## (undated) RX ORDER — FENTANYL CITRATE 50 UG/ML
INJECTION, SOLUTION INTRAMUSCULAR; INTRAVENOUS
Status: DISPENSED
Start: 2023-07-25

## (undated) RX ORDER — ONDANSETRON 2 MG/ML
INJECTION INTRAMUSCULAR; INTRAVENOUS
Status: DISPENSED
Start: 2023-07-25

## (undated) RX ORDER — HYDROMORPHONE HYDROCHLORIDE 1 MG/ML
INJECTION, SOLUTION INTRAMUSCULAR; INTRAVENOUS; SUBCUTANEOUS
Status: DISPENSED
Start: 2023-07-25

## (undated) RX ORDER — METHYLPREDNISOLONE ACETATE 80 MG/ML
INJECTION, SUSPENSION INTRA-ARTICULAR; INTRALESIONAL; INTRAMUSCULAR; SOFT TISSUE
Status: DISPENSED
Start: 2023-11-17

## (undated) RX ORDER — LIDOCAINE HYDROCHLORIDE 10 MG/ML
INJECTION, SOLUTION EPIDURAL; INFILTRATION; INTRACAUDAL; PERINEURAL
Status: DISPENSED
Start: 2023-11-17

## (undated) RX ORDER — PROPOFOL 10 MG/ML
INJECTION, EMULSION INTRAVENOUS
Status: DISPENSED
Start: 2023-07-25

## (undated) RX ORDER — DEXAMETHASONE SODIUM PHOSPHATE 4 MG/ML
INJECTION, SOLUTION INTRA-ARTICULAR; INTRALESIONAL; INTRAMUSCULAR; INTRAVENOUS; SOFT TISSUE
Status: DISPENSED
Start: 2023-07-25

## (undated) RX ORDER — OXYCODONE HYDROCHLORIDE 5 MG/1
TABLET ORAL
Status: DISPENSED
Start: 2023-07-25

## (undated) RX ORDER — METHOCARBAMOL 750 MG/1
TABLET, FILM COATED ORAL
Status: DISPENSED
Start: 2023-07-25

## (undated) RX ORDER — CEFAZOLIN SODIUM/WATER 2 G/20 ML
SYRINGE (ML) INTRAVENOUS
Status: DISPENSED
Start: 2023-07-25